# Patient Record
Sex: FEMALE | Race: WHITE
[De-identification: names, ages, dates, MRNs, and addresses within clinical notes are randomized per-mention and may not be internally consistent; named-entity substitution may affect disease eponyms.]

---

## 2018-12-25 ENCOUNTER — HOSPITAL ENCOUNTER (INPATIENT)
Dept: HOSPITAL 62 - ER | Age: 53
LOS: 5 days | Discharge: HOME | DRG: 690 | End: 2018-12-30
Attending: FAMILY MEDICINE | Admitting: FAMILY MEDICINE
Payer: COMMERCIAL

## 2018-12-25 DIAGNOSIS — Z80.9: ICD-10-CM

## 2018-12-25 DIAGNOSIS — E66.01: ICD-10-CM

## 2018-12-25 DIAGNOSIS — I10: ICD-10-CM

## 2018-12-25 DIAGNOSIS — J44.9: ICD-10-CM

## 2018-12-25 DIAGNOSIS — E11.9: ICD-10-CM

## 2018-12-25 DIAGNOSIS — Z90.710: ICD-10-CM

## 2018-12-25 DIAGNOSIS — I25.10: ICD-10-CM

## 2018-12-25 DIAGNOSIS — E78.00: ICD-10-CM

## 2018-12-25 DIAGNOSIS — B96.20: ICD-10-CM

## 2018-12-25 DIAGNOSIS — I25.2: ICD-10-CM

## 2018-12-25 DIAGNOSIS — Z87.891: ICD-10-CM

## 2018-12-25 DIAGNOSIS — Z85.42: ICD-10-CM

## 2018-12-25 DIAGNOSIS — Z23: ICD-10-CM

## 2018-12-25 DIAGNOSIS — Z90.49: ICD-10-CM

## 2018-12-25 DIAGNOSIS — Z79.84: ICD-10-CM

## 2018-12-25 DIAGNOSIS — N10: Primary | ICD-10-CM

## 2018-12-25 DIAGNOSIS — Z82.49: ICD-10-CM

## 2018-12-25 PROCEDURE — 83735 ASSAY OF MAGNESIUM: CPT

## 2018-12-25 PROCEDURE — 71045 X-RAY EXAM CHEST 1 VIEW: CPT

## 2018-12-25 PROCEDURE — 90686 IIV4 VACC NO PRSV 0.5 ML IM: CPT

## 2018-12-25 PROCEDURE — 87077 CULTURE AEROBIC IDENTIFY: CPT

## 2018-12-25 PROCEDURE — S0119 ONDANSETRON 4 MG: HCPCS

## 2018-12-25 PROCEDURE — 85610 PROTHROMBIN TIME: CPT

## 2018-12-25 PROCEDURE — 87186 SC STD MICRODIL/AGAR DIL: CPT

## 2018-12-25 PROCEDURE — 96374 THER/PROPH/DIAG INJ IV PUSH: CPT

## 2018-12-25 PROCEDURE — 81001 URINALYSIS AUTO W/SCOPE: CPT

## 2018-12-25 PROCEDURE — 80048 BASIC METABOLIC PNL TOTAL CA: CPT

## 2018-12-25 PROCEDURE — 80053 COMPREHEN METABOLIC PANEL: CPT

## 2018-12-25 PROCEDURE — 82803 BLOOD GASES ANY COMBINATION: CPT

## 2018-12-25 PROCEDURE — 87088 URINE BACTERIA CULTURE: CPT

## 2018-12-25 PROCEDURE — 82962 GLUCOSE BLOOD TEST: CPT

## 2018-12-25 PROCEDURE — 93005 ELECTROCARDIOGRAM TRACING: CPT

## 2018-12-25 PROCEDURE — 93010 ELECTROCARDIOGRAM REPORT: CPT

## 2018-12-25 PROCEDURE — 87086 URINE CULTURE/COLONY COUNT: CPT

## 2018-12-25 PROCEDURE — 83605 ASSAY OF LACTIC ACID: CPT

## 2018-12-25 PROCEDURE — 83036 HEMOGLOBIN GLYCOSYLATED A1C: CPT

## 2018-12-25 PROCEDURE — 80061 LIPID PANEL: CPT

## 2018-12-25 PROCEDURE — 99291 CRITICAL CARE FIRST HOUR: CPT

## 2018-12-25 PROCEDURE — 84481 FREE ASSAY (FT-3): CPT

## 2018-12-25 PROCEDURE — 84439 ASSAY OF FREE THYROXINE: CPT

## 2018-12-25 PROCEDURE — 36415 COLL VENOUS BLD VENIPUNCTURE: CPT

## 2018-12-25 PROCEDURE — 96361 HYDRATE IV INFUSION ADD-ON: CPT

## 2018-12-25 PROCEDURE — 84443 ASSAY THYROID STIM HORMONE: CPT

## 2018-12-25 PROCEDURE — 85025 COMPLETE CBC W/AUTO DIFF WBC: CPT

## 2018-12-25 PROCEDURE — 87040 BLOOD CULTURE FOR BACTERIA: CPT

## 2018-12-25 NOTE — EKG REPORT
SEVERITY:- BORDERLINE ECG -

SINUS TACHYCARDIA

BORDERLINE T WAVE ABNORMALITIES

:

Confirmed by: Amisha Aviles 25-Dec-2018 23:56:58

## 2018-12-26 LAB
ADD MANUAL DIFF: NO
ALBUMIN SERPL-MCNC: 4.3 G/DL (ref 3.5–5)
ALP SERPL-CCNC: 77 U/L (ref 38–126)
ALT SERPL-CCNC: 30 U/L (ref 9–52)
ANION GAP SERPL CALC-SCNC: 14 MMOL/L (ref 5–19)
APPEARANCE UR: (no result)
APTT PPP: YELLOW S
AST SERPL-CCNC: 41 U/L (ref 14–36)
BASE EXCESS BLDV CALC-SCNC: -1.2 MMOL/L
BASOPHILS # BLD AUTO: 0 10^3/UL (ref 0–0.2)
BASOPHILS NFR BLD AUTO: 0.2 % (ref 0–2)
BILIRUB DIRECT SERPL-MCNC: 0.3 MG/DL (ref 0–0.4)
BILIRUB SERPL-MCNC: 0.3 MG/DL (ref 0.2–1.3)
BILIRUB UR QL STRIP: NEGATIVE
BUN SERPL-MCNC: 13 MG/DL (ref 7–20)
CALCIUM: 10 MG/DL (ref 8.4–10.2)
CHLORIDE SERPL-SCNC: 97 MMOL/L (ref 98–107)
CO2 SERPL-SCNC: 25 MMOL/L (ref 22–30)
EOSINOPHIL # BLD AUTO: 0.1 10^3/UL (ref 0–0.6)
EOSINOPHIL NFR BLD AUTO: 0.7 % (ref 0–6)
ERYTHROCYTE [DISTWIDTH] IN BLOOD BY AUTOMATED COUNT: 13.8 % (ref 11.5–14)
FREE T4 (FREE THYROXINE): 1.06 NG/DL (ref 0.78–2.19)
GLUCOSE SERPL-MCNC: 346 MG/DL (ref 75–110)
GLUCOSE UR STRIP-MCNC: >=500 MG/DL
HCO3 BLDV-SCNC: 23.4 MMOL/L (ref 20–32)
HCT VFR BLD CALC: 38.3 % (ref 36–47)
HGB BLD-MCNC: 12.6 G/DL (ref 12–15.5)
INR PPP: 1.03
KETONES UR STRIP-MCNC: NEGATIVE MG/DL
LYMPHOCYTES # BLD AUTO: 1.6 10^3/UL (ref 0.5–4.7)
LYMPHOCYTES NFR BLD AUTO: 14.5 % (ref 13–45)
MCH RBC QN AUTO: 28.7 PG (ref 27–33.4)
MCHC RBC AUTO-ENTMCNC: 33 G/DL (ref 32–36)
MCV RBC AUTO: 87 FL (ref 80–97)
MONOCYTES # BLD AUTO: 0.6 10^3/UL (ref 0.1–1.4)
MONOCYTES NFR BLD AUTO: 5.8 % (ref 3–13)
NEUTROPHILS # BLD AUTO: 8.6 10^3/UL (ref 1.7–8.2)
NEUTS SEG NFR BLD AUTO: 78.8 % (ref 42–78)
NITRITE UR QL STRIP: NEGATIVE
PCO2 BLDV: 38.6 MMHG (ref 35–63)
PH BLDV: 7.4 [PH] (ref 7.3–7.42)
PH UR STRIP: 6 [PH] (ref 5–9)
PLATELET # BLD: 305 10^3/UL (ref 150–450)
POTASSIUM SERPL-SCNC: 4 MMOL/L (ref 3.6–5)
PROT SERPL-MCNC: 7.6 G/DL (ref 6.3–8.2)
PROT UR STRIP-MCNC: NEGATIVE MG/DL
PROTHROMBIN TIME: 14 SEC (ref 11.4–15.4)
RBC # BLD AUTO: 4.4 10^6/UL (ref 3.72–5.28)
SODIUM SERPL-SCNC: 135.7 MMOL/L (ref 137–145)
SP GR UR STRIP: 1.01
T3FREE SERPL-MCNC: 3.01 PG/ML (ref 2.77–5.27)
TOTAL CELLS COUNTED % (AUTO): 100 %
UROBILINOGEN UR-MCNC: NEGATIVE MG/DL (ref ?–2)
WBC # BLD AUTO: 10.9 10^3/UL (ref 4–10.5)

## 2018-12-26 RX ADMIN — HEPARIN SODIUM SCH UNIT: 5000 INJECTION, SOLUTION INTRAVENOUS; SUBCUTANEOUS at 05:21

## 2018-12-26 RX ADMIN — FAMOTIDINE SCH MG: 20 TABLET, FILM COATED ORAL at 21:20

## 2018-12-26 RX ADMIN — INSULIN HUMAN PRN UNIT: 100 INJECTION, SOLUTION PARENTERAL at 22:50

## 2018-12-26 RX ADMIN — Medication SCH ML: at 21:20

## 2018-12-26 RX ADMIN — MORPHINE SULFATE PRN MG: 10 INJECTION INTRAMUSCULAR; INTRAVENOUS; SUBCUTANEOUS at 16:16

## 2018-12-26 RX ADMIN — Medication SCH: at 05:02

## 2018-12-26 RX ADMIN — BUPROPION HYDROCHLORIDE SCH MG: 100 TABLET, FILM COATED ORAL at 21:20

## 2018-12-26 RX ADMIN — DOCUSATE SODIUM SCH MG: 100 CAPSULE, LIQUID FILLED ORAL at 18:13

## 2018-12-26 RX ADMIN — CYCLOBENZAPRINE HYDROCHLORIDE SCH MG: 10 TABLET, FILM COATED ORAL at 21:20

## 2018-12-26 RX ADMIN — DOCUSATE SODIUM SCH MG: 100 CAPSULE, LIQUID FILLED ORAL at 11:12

## 2018-12-26 RX ADMIN — HEPARIN SODIUM SCH UNIT: 5000 INJECTION, SOLUTION INTRAVENOUS; SUBCUTANEOUS at 21:20

## 2018-12-26 RX ADMIN — OMEGA-3-ACID ETHYL ESTERS SCH GM: 900 CAPSULE ORAL at 18:13

## 2018-12-26 RX ADMIN — ACETAMINOPHEN PRN MG: 325 TABLET ORAL at 06:21

## 2018-12-26 RX ADMIN — HEPARIN SODIUM SCH UNIT: 5000 INJECTION, SOLUTION INTRAVENOUS; SUBCUTANEOUS at 13:28

## 2018-12-26 RX ADMIN — GABAPENTIN SCH MG: 400 CAPSULE ORAL at 21:19

## 2018-12-26 RX ADMIN — ACETAMINOPHEN PRN MG: 325 TABLET ORAL at 21:24

## 2018-12-26 RX ADMIN — ASPIRIN SCH MG: 81 TABLET, COATED ORAL at 21:19

## 2018-12-26 RX ADMIN — FAMOTIDINE SCH MG: 20 TABLET, FILM COATED ORAL at 11:12

## 2018-12-26 RX ADMIN — MORPHINE SULFATE PRN MG: 10 INJECTION INTRAMUSCULAR; INTRAVENOUS; SUBCUTANEOUS at 09:01

## 2018-12-26 RX ADMIN — Medication SCH ML: at 13:28

## 2018-12-26 RX ADMIN — Medication SCH MG: at 21:19

## 2018-12-26 RX ADMIN — INSULIN HUMAN PRN UNIT: 100 INJECTION, SOLUTION PARENTERAL at 11:13

## 2018-12-26 NOTE — PDOC PROGRESS REPORT
Subjective


Progress Note for:: 12/26/18


Subjective:: 





53-year-old female admitted for fever and chills initially lactic acid was high 

4.0 improved to 2.2 today at the time of admission she is also have a subjective

fever tachycardia with heart rate of 105 elevated lactic acid levels meeting the

criteria for Sirs.  No acute events in the last 24 hours.  T-max is 99.6.


Reason For Visit: 


SIRS SYNDROME WITH PROBABLE URINARY TRACT








Physical Exam


Vital Signs: 


                                        











Temp Pulse Resp BP Pulse Ox


 


 99.6 F   99   19   120/66   96 


 


 12/26/18 07:57  12/26/18 07:57  12/26/18 07:57  12/26/18 07:57  12/26/18 07:57








                                 Intake & Output











 12/25/18 12/26/18 12/27/18





 06:59 06:59 06:59


 


Intake Total  3494 


 


Balance  3494 


 


Weight  123.8 kg 











General appearance: PRESENT: other - Morbidly obese female with a BMI of more 

than 50.


Eye exam: PRESENT: PERRLA


Mouth exam: PRESENT: moist


Neck exam: ABSENT: carotid bruit, JVD, lymphadenopathy, thyromegaly


Respiratory exam: PRESENT: clear to auscultation david.  ABSENT: rales, rhonchi, 

wheezes


Cardiovascular exam: PRESENT: tachycardia


GI/Abdominal exam: PRESENT: normal bowel sounds, soft.  ABSENT: distended, gu

arding, mass, organolmegaly, rebound, tenderness


Extremities exam: PRESENT: full ROM.  ABSENT: calf tenderness, clubbing, pedal 

edema


Neurological exam: PRESENT: alert, awake, oriented to person, oriented to place,

oriented to time, oriented to situation, CN II-XII grossly intact.  ABSENT: 

motor sensory deficit





Results


Laboratory Results: 


                                        





                                 12/26/18 00:30 





                                 12/26/18 00:30 





                                        











  12/26/18 12/26/18 12/26/18





  00:30 00:30 00:30


 


WBC  10.9 H  


 


RBC  4.40  


 


Hgb  12.6  


 


Hct  38.3  


 


MCV  87  


 


MCH  28.7  


 


MCHC  33.0  


 


RDW  13.8  


 


Plt Count  305  


 


Seg Neutrophils %  78.8 H  


 


Lymphocytes %  14.5  


 


Monocytes %  5.8  


 


Eosinophils %  0.7  


 


Basophils %  0.2  


 


Absolute Neutrophils  8.6 H  


 


Absolute Lymphocytes  1.6  


 


Absolute Monocytes  0.6  


 


Absolute Eosinophils  0.1  


 


Absolute Basophils  0.0  


 


VBG pH   


 


VBG pCO2   


 


VBG HCO3   


 


VBG Base Excess   


 


Sodium   135.7 L 


 


Potassium   4.0 


 


Chloride   97 L 


 


Carbon Dioxide   25 


 


Anion Gap   14 


 


BUN   13 


 


Creatinine   0.77 


 


Est GFR ( Amer)   > 60 


 


Est GFR (Non-Af Amer)   > 60 


 


Glucose   346 H 


 


Lactic Acid    4.0 H


 


Calcium   10.0 


 


Total Bilirubin   0.3 


 


AST   41 H 


 


ALT   30 


 


Alkaline Phosphatase   77 


 


Total Protein   7.6 


 


Albumin   4.3 


 


Free T4   


 


Free T3 pg/mL   


 


Urine Color   


 


Urine Appearance   


 


Urine pH   


 


Ur Specific Gravity   


 


Urine Protein   


 


Urine Glucose (UA)   


 


Urine Ketones   


 


Urine Blood   


 


Urine Nitrite   


 


Ur Leukocyte Esterase   


 


Urine WBC (Auto)   


 


Urine RBC (Auto)   














  12/26/18 12/26/18 12/26/18





  00:30 00:30 00:30


 


WBC   


 


RBC   


 


Hgb   


 


Hct   


 


MCV   


 


MCH   


 


MCHC   


 


RDW   


 


Plt Count   


 


Seg Neutrophils %   


 


Lymphocytes %   


 


Monocytes %   


 


Eosinophils %   


 


Basophils %   


 


Absolute Neutrophils   


 


Absolute Lymphocytes   


 


Absolute Monocytes   


 


Absolute Eosinophils   


 


Absolute Basophils   


 


VBG pH  7.40  


 


VBG pCO2  38.6  


 


VBG HCO3  23.4  


 


VBG Base Excess  -1.2  


 


Sodium   


 


Potassium   


 


Chloride   


 


Carbon Dioxide   


 


Anion Gap   


 


BUN   


 


Creatinine   


 


Est GFR ( Amer)   


 


Est GFR (Non-Af Amer)   


 


Glucose   


 


Lactic Acid   


 


Calcium   


 


Total Bilirubin   


 


AST   


 


ALT   


 


Alkaline Phosphatase   


 


Total Protein   


 


Albumin   


 


Free T4    1.06


 


Free T3 pg/mL    3.01


 


Urine Color   YELLOW 


 


Urine Appearance   SLIGHTLY-CLOUDY 


 


Urine pH   6.0 


 


Ur Specific Gravity   1.012 


 


Urine Protein   NEGATIVE 


 


Urine Glucose (UA)   >=500 H 


 


Urine Ketones   NEGATIVE 


 


Urine Blood   MODERATE H 


 


Urine Nitrite   NEGATIVE 


 


Ur Leukocyte Esterase   LARGE H 


 


Urine WBC (Auto)   145 


 


Urine RBC (Auto)   19 














  12/26/18





  04:22


 


WBC 


 


RBC 


 


Hgb 


 


Hct 


 


MCV 


 


MCH 


 


MCHC 


 


RDW 


 


Plt Count 


 


Seg Neutrophils % 


 


Lymphocytes % 


 


Monocytes % 


 


Eosinophils % 


 


Basophils % 


 


Absolute Neutrophils 


 


Absolute Lymphocytes 


 


Absolute Monocytes 


 


Absolute Eosinophils 


 


Absolute Basophils 


 


VBG pH 


 


VBG pCO2 


 


VBG HCO3 


 


VBG Base Excess 


 


Sodium 


 


Potassium 


 


Chloride 


 


Carbon Dioxide 


 


Anion Gap 


 


BUN 


 


Creatinine 


 


Est GFR ( Amer) 


 


Est GFR (Non-Af Amer) 


 


Glucose 


 


Lactic Acid  2.2 H


 


Calcium 


 


Total Bilirubin 


 


AST 


 


ALT 


 


Alkaline Phosphatase 


 


Total Protein 


 


Albumin 


 


Free T4 


 


Free T3 pg/mL 


 


Urine Color 


 


Urine Appearance 


 


Urine pH 


 


Ur Specific Gravity 


 


Urine Protein 


 


Urine Glucose (UA) 


 


Urine Ketones 


 


Urine Blood 


 


Urine Nitrite 


 


Ur Leukocyte Esterase 


 


Urine WBC (Auto) 


 


Urine RBC (Auto) 











Impressions: 


                                        





Chest X-Ray  12/26/18 00:25


IMPRESSION:


 


No acute cardiopulmonary process


 


 


copyright 2011 [employer]- All Rights Reserved


 














Assessment & Plan





- Diagnosis


(1) SIRS (systemic inflammatory response syndrome)


Is this a current diagnosis for this admission?: Yes   


Plan: 


With lactic acidosis, mild leukocytosis, minimal tachycardia and a possible 

urinary tract infection, SIRS criteria have been met.  However this patient is 

very unlikely to have sepsis, so much so that at this point it can be 

affirmatively stated that sepsis has been ruled out.  She will be treated with 

broad-spectrum antibiotics for her role urinary tract infection and serial 

lactic acid measurements will be obtained her vital signs will be observed and 

her overall clinical course will be monitored.  Serial laboratory evaluations of

her metabolic profile and CBC will be obtained.








12/26/2018-temperature today is 99.6.  Patient is on Invanz.  Lactic acid level 

came down to 2.2.  We are going to continue to follow the CBC and chemistry.  

Cultures urine cultures are pending.  Plan is to continue the present manag

ement.








(2) UTI (urinary tract infection)


Qualifiers: 


   Urinary tract infection type: acute pyelonephritis   Qualified Code(s): N10 -

Acute pyelonephritis   


Is this a current diagnosis for this admission?: Yes   


Plan: 


Patient was noted to have acute right CVA tenderness far greater than the left 

giving suspicion to a distinct possibility of an acute right pyelonephritis.  

Her urine does show significant pyuria based on her positive leukocyte esterase 

however a moderately positive blood chemical reaction is not necessarily 

indicative of hematuria.  She will be treated with broad-spectrum antibiotics 

until urine culture results are available and spectrum of the antibiotic therapy

can be narrowed as appropriate.





12/26/2018-on examination there is no right CVA tenderness.  Urinalysis shows 

pyuria with positive leukocyte esterase.  Cultures are pending.  Patient is on 

broad-spectrum antibiotics.  Is to continue the present management and wait for 

the urine cultures.








(3) Diabetes mellitus type 2 in obese


Is this a current diagnosis for this admission?: Yes   


Plan: 


Patient will be treated with her usual diabetic therapy with the addition of a 

sliding scale insulin coverage for hyperglycemia.  Hemoglobin A1c will be 

obtained to assess the efficacy of the prior therapy and adjustments will be 

made as appropriate.








12/26/2018-started on insulin sliding scale.  Blood sugars are 253 today.  

Patient is on metformin at home.  We are going to hold metformin.  Patient is 

also on Victoza.  We are going to hold Victoza during this hospital stay.  

Hemoglobin A1c is pending.








(4) HTN (hypertension)


Qualifiers: 


   Hypertension type: essential hypertension   Qualified Code(s): I10 - 

Essential (primary) hypertension   


Is this a current diagnosis for this admission?: Yes   


Plan: 


Patient will be continued on her current antihypertensive regiment once her home

meds have been established.  Efficacy of this therapy will be assessed over 

hospital course with serial evaluations of her vital signs including blood 

pressure.








12/26/2018 blood pressure is 120/66.  Plan for today is to discontinue IV fluids

and restart on losartan.  pT is on 50 mg of losartan daily at home.








(5) Morbid obesity with BMI of 45.0-49.9, adult


Is this a current diagnosis for this admission?: Yes   


Plan: 


12/26/2018-diet exercise weight loss and lifestyle modifications are advised.  

Dietary consult was requested.  BMI is more than 50.








- Time


Time Spent with patient: 15-24 minutes


Medications reviewed and adjusted accordingly: Yes


Anticipated discharge: Home

## 2018-12-26 NOTE — RADIOLOGY REPORT (SQ)
EXAM DESCRIPTION: 



XR CHEST 1 VIEW



COMPLETED DATE/TME:  12/26/2018 00:25



CLINICAL HISTORY: 



53 years, Female, SHORT OF BREATH, TACHYCARDIA



COMPARISON:

None.



NUMBER OF VIEWS:

1



TECHNIQUE:

Portable chest



LIMITATIONS:

None.



FINDINGS:



The heart size is normal. Osteopenia. Mild atheromatous change

thoracic aorta. No pneumothorax. Lungs are clear



IMPRESSION:



No acute cardiopulmonary process

 



copyright 2011 [employer]- All Rights Reserved

## 2018-12-26 NOTE — PDOC H&P
History of Present Illness


Admission Date/PCP: 


  18 01:47





  





Patient complains of: Fever and chills


History of Present Illness: 


ROJAS ORELLANA is a 53 year old female who presents the emergency room with a 1 

day history of fever with chills associated with lower back pain.  Subjective 

fever with chills has been severe and the lumbar (vague, nonradiating, bilateral

right possibly worse than left) pressure-like pain which she rates as very 

severe.  Both the pain and fever have been present since their onset on the day 

prior to admission.  The symptoms began as a backache and low-grade fever in the

early afternoon and progressively worsened gradually throughout the evening 

until she came to the emergency room.  She admits numerous prior similar 

episodes some of which have required hospitalization and treatment for "sepsis",

these usually occur with urinary tract or respiratory tract infections.  She has

not identified any aggravating or ameliorating factors for her fever or back 

pain.  In the emergency room she was found to have a normal white count and be 

afebrile.  Her urinalysis was suspect for a urinary tract infection and she was 

about to be discharged home when her serum lactic acid was reported is 4.0.  The

patient was then admitted to the hospital to be evaluated for possible sepsis at

the insistence of the emergency room physician.





Past Medical History


Cardiac Medical History: Reports: Coronary Artery Disease, Myocardial 

Infarction, Hyperlipidema, Hypertension


   Denies: DVT, Pulmonary Embolism


Pulmonary Medical History: Reports: Bronchitis, Pneumonia


   Denies: Chronic Obstructive Pulmonary Disease (COPD)


EENT Medical History: Reports: None


Neurological Medical History: 


   Denies: Hemorrhagic CVA, Ischemic CVA, Seizures


Endocrine Medical History: Reports: Diabetes Mellitus Type 2, Obesity


Renal/ Medical History: Reports: Other - Frequent urinary tract infections, hx

of horseshoe kidney (non-unified)


   Denies: Chronic Kidney Disease, Nephrolithiasis


Malignancy Medical History: Reports: Other - Uterine cancer


GI Medical History: 


   Denies: Cirrhosis, Hepatitis


Musculoskeltal Medical History: 


   Denies: Arthritis, Gout


Skin Medical History: 


   Denies: Eczema, Psoriasis


Psychiatric Medical History: 


   Denies: Alcohol Dependency, Substance Abuse, Tobacco Dependency


Traumatic Medical History: Reports: None


Hematology: 


   Denies: Anemia, Bleeding Tendencies


Infectious Medical History: Reports: None





Past Surgical History


Past Surgical History: Reports:  Section, Cholecystectomy, Hysterectomy





Social History


Smoking Status: Former Smoker





- Advance Directive


Resuscitation Status: Full Code


Surrogate healthcare decision maker:: 


Agustina Tse





Family History


Family History: Hypertension, Malignancy


Parental Family History Reviewed: Yes


Children Family History Reviewed: No


Sibling(s) Family History Reviewed.: Yes





Medication/Allergy


Allergies/Adverse Reactions: 


                                        





No Known Allergies Allergy (Verified 18 01:25)


   











Review of Systems


Constitutional: PRESENT: as per HPI, chills, fever(s)


Eyes: ABSENT: visual disturbances, other - Ocular pain


Ears: ABSENT: hearing changes, other - Ear pain


Nose, Mouth, and Throat: ABSENT: mouth pain, sore throat


Cardiovascular: ABSENT: chest pain, dyspnea on exertion, orthropnea, 

palpitations


Respiratory: ABSENT: cough, dyspnea


Gastrointestinal: ABSENT: abdominal pain, constipation, diarrhea, nausea, 

vomiting


Genitourinary: PRESENT: as per HPI, other - Flank pain, foul-smelling urine.  

ABSENT: dysuria, hematuria


Musculoskeletal: PRESENT: as per HPI, back pain.  ABSENT: deformity, joint 

swelling


Integumentary: ABSENT: pruritus, rash


Neurological: ABSENT: confusion, convulsions, memory loss, syncope, vertigo


Psychiatric: ABSENT: anxiety, depression


Endocrine: ABSENT: cold intolerance, heat intolerance


Hematologic/Lymphatic: ABSENT: easy bleeding, easy bruising





Physical Exam


Vital Signs: 


                                        











Temp Pulse Resp BP Pulse Ox


 


 98.4 F   131 H  19   134/67 H  97 


 


 18 00:40  18 23:06  18 02:01  18 02:01  18 02:01








                                 Intake & Output











 18





 23:59 23:59 23:59


 


Intake Total   1000


 


Balance   1000


 


Weight  119.8 kg 











General appearance: PRESENT: no acute distress, cooperative, morbidly obese


Head exam: PRESENT: atraumatic, normocephalic


Eye exam: PRESENT: EOMI.  ABSENT: conjunctival injection, scleral icterus


Ear exam: PRESENT: normal external ear exam.  ABSENT: bleeding, drainage


Mouth exam: PRESENT: dry mucosa, neck supple


Neck exam: ABSENT: thyromegaly, tracheal deviation


Respiratory exam: PRESENT: clear to auscultation david, symmetrical, unlabored


Cardiovascular exam: PRESENT: RRR.  ABSENT: clicks, gallop, rubs


Pulses: PRESENT: normal radial pulses, normal dorsalis pedis pul


Vascular exam: PRESENT: normal capillary refill.  ABSENT: pallor


GI/Abdominal exam: PRESENT: normal bowel sounds, soft


Rectal exam: PRESENT: deferred


Extremities exam: ABSENT: joint swelling, pedal edema


Musculoskeletal exam: ABSENT: deformity, dislocation, tenderness


Neurological exam: PRESENT: alert, oriented to person, oriented to place, 

oriented to time, oriented to situation, CN II-XII grossly intact.  ABSENT: 

motor sensory deficit


Psychiatric exam: PRESENT: appropriate affect, normal mood


Skin exam: PRESENT: dry, intact, warm.  ABSENT: jaundice, pallor, rash, 

urticaria





Results


Laboratory Results: 


                                        





                                 18 00:30 





                                 18 00:30 





                                        











  18





  00:30 00:30 00:30


 


WBC  10.9 H  


 


RBC  4.40  


 


Hgb  12.6  


 


Hct  38.3  


 


MCV  87  


 


MCH  28.7  


 


MCHC  33.0  


 


RDW  13.8  


 


Plt Count  305  


 


Seg Neutrophils %  78.8 H  


 


Lymphocytes %  14.5  


 


Monocytes %  5.8  


 


Eosinophils %  0.7  


 


Basophils %  0.2  


 


Absolute Neutrophils  8.6 H  


 


Absolute Lymphocytes  1.6  


 


Absolute Monocytes  0.6  


 


Absolute Eosinophils  0.1  


 


Absolute Basophils  0.0  


 


VBG pH   


 


VBG pCO2   


 


VBG HCO3   


 


VBG Base Excess   


 


Sodium   135.7 L 


 


Potassium   4.0 


 


Chloride   97 L 


 


Carbon Dioxide   25 


 


Anion Gap   14 


 


BUN   13 


 


Creatinine   0.77 


 


Est GFR ( Amer)   > 60 


 


Est GFR (Non-Af Amer)   > 60 


 


Glucose   346 H 


 


Lactic Acid    4.0 H


 


Calcium   10.0 


 


Total Bilirubin   0.3 


 


AST   41 H 


 


ALT   30 


 


Alkaline Phosphatase   77 


 


Total Protein   7.6 


 


Albumin   4.3 


 


Urine Color   


 


Urine Appearance   


 


Urine pH   


 


Ur Specific Gravity   


 


Urine Protein   


 


Urine Glucose (UA)   


 


Urine Ketones   


 


Urine Blood   


 


Urine Nitrite   


 


Ur Leukocyte Esterase   


 


Urine WBC (Auto)   


 


Urine RBC (Auto)   














  18





  00:30 00:30


 


WBC  


 


RBC  


 


Hgb  


 


Hct  


 


MCV  


 


MCH  


 


MCHC  


 


RDW  


 


Plt Count  


 


Seg Neutrophils %  


 


Lymphocytes %  


 


Monocytes %  


 


Eosinophils %  


 


Basophils %  


 


Absolute Neutrophils  


 


Absolute Lymphocytes  


 


Absolute Monocytes  


 


Absolute Eosinophils  


 


Absolute Basophils  


 


VBG pH  7.40 


 


VBG pCO2  38.6 


 


VBG HCO3  23.4 


 


VBG Base Excess  -1.2 


 


Sodium  


 


Potassium  


 


Chloride  


 


Carbon Dioxide  


 


Anion Gap  


 


BUN  


 


Creatinine  


 


Est GFR ( Amer)  


 


Est GFR (Non-Af Amer)  


 


Glucose  


 


Lactic Acid  


 


Calcium  


 


Total Bilirubin  


 


AST  


 


ALT  


 


Alkaline Phosphatase  


 


Total Protein  


 


Albumin  


 


Urine Color   YELLOW


 


Urine Appearance   SLIGHTLY-CLOUDY


 


Urine pH   6.0


 


Ur Specific Gravity   1.012


 


Urine Protein   NEGATIVE


 


Urine Glucose (UA)   >=500 H


 


Urine Ketones   NEGATIVE


 


Urine Blood   MODERATE H


 


Urine Nitrite   NEGATIVE


 


Ur Leukocyte Esterase   LARGE H


 


Urine WBC (Auto)   145


 


Urine RBC (Auto)   19











Impressions: 


                                        





Chest X-Ray  18 00:25


IMPRESSION:


 


No acute cardiopulmonary process


 


 


copyright  [employer]- All Rights Reserved


 














Assessment & Plan





- Diagnosis


(1) SIRS (systemic inflammatory response syndrome)


Is this a current diagnosis for this admission?: Yes   


Plan: 


With lactic acidosis, mild leukocytosis, minimal tachycardia and a possible 

urinary tract infection, SIRS criteria have been met.  However this patient is 

very unlikely to have sepsis, so much so that at this point it can be 

affirmatively stated that sepsis has been ruled out.  She will be treated with 

broad-spectrum antibiotics for her role urinary tract infection and serial 

lactic acid measurements will be obtained her vital signs will be observed and 

her overall clinical course will be monitored.  Serial laboratory evaluations of

her metabolic profile and CBC will be obtained.








(2) UTI (urinary tract infection)


Qualifiers: 


   Urinary tract infection type: acute pyelonephritis   Qualified Code(s): N10 -

Acute pyelonephritis   


Is this a current diagnosis for this admission?: Yes   


Plan: 


Patient was noted to have acute right CVA tenderness far greater than the left 

giving suspicion to a distinct possibility of an acute right pyelonephritis.  

Her urine does show significant pyuria based on her positive leukocyte esterase 

however a moderately positive blood chemical reaction is not necessarily 

indicative of hematuria.  She will be treated with broad-spectrum antibiotics 

until urine culture results are available and spectrum of the antibiotic therapy

can be narrowed as appropriate.








(3) Diabetes mellitus type 2 in obese


Is this a current diagnosis for this admission?: Yes   


Plan: 


Patient will be treated with her usual diabetic therapy with the addition of a 

sliding scale insulin coverage for hyperglycemia.  Hemoglobin A1c will be 

obtained to assess the efficacy of the prior therapy and adjustments will be 

made as appropriate.








(4) HTN (hypertension)


Qualifiers: 


   Hypertension type: essential hypertension   Qualified Code(s): I10 - Essent

ial (primary) hypertension   


Is this a current diagnosis for this admission?: Yes   


Plan: 


Patient will be continued on her current antihypertensive regiment once her home

meds have been established.  Efficacy of this therapy will be assessed over 

hospital course with serial evaluations of her vital signs including blood 

pressure.








(5) Morbid obesity with BMI of 45.0-49.9, adult


Is this a current diagnosis for this admission?: Yes   


Plan: 


Patient will be evaluated by dietitian for instruction in weight loss diet, 

improved diabetic control and lifestyle changes to assist the patient in 

maintaining a long-term improvement in her health via a long-term weight loss 

and diabetic diet management plan.








- Time


Time Spent: 30 to 50 Minutes


Critical Time spent with patient: Less than 15 minutes


Medications reviewed and adjusted accordingly: Yes - Patient has some difficulty

remembering her medications.


Anticipated discharge: Home, Home with Homehealth





- Inpatient Certification


Based on my medical assessment, after consideration of the patient's 

comorbidities, presenting symptoms, or acuity I expect that the services needed 

warrant INPATIENT care.: Yes


I certify that my determination is in accordance with my understanding of 

Medicare's requirements for reasonable and necessary INPATIENT services [42 CFR 

412.3e].: Yes


Medical Necessity: Significant Comorbidiites Make Outpatient Treatment Too 

Risky, Need Close Monitoring Due to Risk of Patient Decompensation, Need for IV 

Antibiotics

## 2018-12-26 NOTE — ER DOCUMENT REPORT
ED General





- General


Chief Complaint: Shortness Of Breath


Stated Complaint: DIFFICULTY BREATHING/URINARY ISSUE


Time Seen by Provider: 18 00:21


Notes: 





Patient is a 53-year-old female with COPD, diabetes, hypertension that presents 

to the emergency department for chief complaint of fever, chills, and concern 

for UTI.  Patient states that she is visiting from out of town, and states 

starting having chills, sweats and subjective fever, which she usually gets when

she has a urinary tract infection, she states she is been septic before, r

equired hospitalization, in the past.  She is on suppressive medication for 

UTIs, which she states has reduce the number of severe infection she gets, but 

she still gets some.  She has had urinary frequency, but denies any dysuria.  

She denies having any abdominal pain, has had mild nausea without vomiting.  She

is chronically short of breath and on oxygen 2 L 24 hours a day for her COPD, 

but her shortness of breath is not worsened anytime recently.  Denies having any

cough, chest pain.





Past Medical History: COPD, hypertension, diabetes mellitus


Past Surgical History: Kidney surgery, hysterectomy, , cholecystectomy


Social History: Former smoker, denies alcohol or drug use.


Family History: Reviewed and noncontributory for presenting illness


Allergies: Reviewed, see documented allergy list. 





REVIEW OF SYSTEMS:


Other than noted above, the 12 point review of systems was reviewed with the 

patient and were negative, all pertinent findings are included in the HPI.





PHYSICAL EXAMINATION:





Vital signs reviewed, nursing noted reviewed. 





GENERAL: Obese female, appears uncomfortable





HEAD: Atraumatic, normocephalic.





EYES: Eyes appear normal, extraocular movements intact, sclera anicteric, 

conjunctiva are normal.





ENT: nares patent, oropharynx clear without exudates.  Moist mucous membranes.





NECK: Normal range of motion, supple without lymphadenopathy





LUNGS: Diminished lung sounds, no wheezing, rhonchi or acute respiratory 

distress.





HEART: Heart rate tachycardic, regular rhythm





ABDOMEN: Soft, obese, nontender, normoactive bowel sounds.  No rebound, 

guarding, or rigidity. No masses appreciated.





EXTREMITIES: Nontender, good range of motion, trace pedal edema bilaterally





NEUROLOGICAL: No focal neurological deficits. Moves all extremities 

spontaneously Motor and sensory grossly intact on exam.





PSYCH: Normal mood, normal affect.





SKIN: Warm, Dry, normal turgor, no rashes or lesions noted on exposed skin





TRAVEL OUTSIDE OF THE [place] IN LAST 30 DAYS: No





- Related Data


Allergies/Adverse Reactions: 


                                        





No Known Allergies Allergy (Verified 18 01:25)


   











Past Medical History





- Social History


Smoking Status: Former Smoker


Family History: Reviewed & Not Pertinent





Physical Exam





- Vital signs


Vitals: 


                                        











Temp Pulse Resp BP Pulse Ox


 


 100.3 F   131 H  24 H  132/63 H  95 


 


 18 23:06  18 23:06  18 23:06  18 23:06  18 23:06














Course





- Re-evaluation


Re-evalutation: 





Patient seen and examined vital signs reviewed. 





Laboratory data and imaging were ordered as appropriate for the patient's 

presenting symptoms and complaint, with consideration of any critical or life 

threatening conditions that may be associated with their obtained history and 

exam as noted above.





Patient was treated with IV fluid bolusing, IV Zosyn





Results were reviewed when available and demonstrated mild leukocytosis, 

elevated lactic acid, and positive UA for urinary tract infection, chest x-ray 

was not convincing of pneumonia.  Patient did meet severe sepsis criteria based 

on elevated lactic acid, tachycardia, and source of UTI.





The patient was re-evaluated and was still tachycardic, additional IV fluid was 

ordered, patient's IV fluids were based off of ideal body weight, to obtain her 

30 mL/kg bolus, given that she is morbidly obese.





Evaluation was most consistent with sepsis secondary to urinary tract infection,

tachycardia, leukocytosis





Results were discussed with the patient at this point after careful 

consideration I feel that that patient should be admitted to the hospital. This 

was discussed with the patient that it is in the best interest for their care to

be admitted for further evaluation and management.  Patient agreed with this 

plan of care. A call was placed to the admitted physician, Dr. Weiland who 

graciously accepted the patient onto their service.





*Note is created using voice recognition software and may contain spelling, s

yntax or grammatical errors.








Laboratory











  18





  00:30 00:30 00:30


 


WBC  10.9 H  


 


RBC  4.40  


 


Hgb  12.6  


 


Hct  38.3  


 


MCV  87  


 


MCH  28.7  


 


MCHC  33.0  


 


RDW  13.8  


 


Plt Count  305  


 


Seg Neutrophils %  78.8 H  


 


Lymphocytes %  14.5  


 


Monocytes %  5.8  


 


Eosinophils %  0.7  


 


Basophils %  0.2  


 


Absolute Neutrophils  8.6 H  


 


Absolute Lymphocytes  1.6  


 


Absolute Monocytes  0.6  


 


Absolute Eosinophils  0.1  


 


Absolute Basophils  0.0  


 


PT   14.0 


 


INR   1.03 


 


VBG pH   


 


VBG pCO2   


 


VBG HCO3   


 


VBG Base Excess   


 


Sodium    135.7 L


 


Potassium    4.0


 


Chloride    97 L


 


Carbon Dioxide    25


 


Anion Gap    14


 


BUN    13


 


Creatinine    0.77


 


Est GFR ( Amer)    > 60


 


Est GFR (Non-Af Amer)    > 60


 


Glucose    346 H


 


POC Glucose   


 


Lactic Acid   


 


Calcium    10.0


 


Total Bilirubin    0.3


 


Direct Bilirubin    0.3


 


Neonat Total Bilirubin    Not Reportable


 


Neonat Direct Bilirubin    Not Reportable


 


Neonat Indirect Bili    Not Reportable


 


AST    41 H


 


ALT    30


 


Alkaline Phosphatase    77


 


Total Protein    7.6


 


Albumin    4.3


 


Urine Color   


 


Urine Appearance   


 


Urine pH   


 


Ur Specific Gravity   


 


Urine Protein   


 


Urine Glucose (UA)   


 


Urine Ketones   


 


Urine Blood   


 


Urine Nitrite   


 


Urine Bilirubin   


 


Urine Urobilinogen   


 


Ur Leukocyte Esterase   


 


Urine WBC (Auto)   


 


Urine RBC (Auto)   


 


Urine Bacteria (Auto)   


 


Squamous Epi Cells Auto   


 


Urine Ascorbic Acid   














  18





  00:30 00:30 00:30


 


WBC   


 


RBC   


 


Hgb   


 


Hct   


 


MCV   


 


MCH   


 


MCHC   


 


RDW   


 


Plt Count   


 


Seg Neutrophils %   


 


Lymphocytes %   


 


Monocytes %   


 


Eosinophils %   


 


Basophils %   


 


Absolute Neutrophils   


 


Absolute Lymphocytes   


 


Absolute Monocytes   


 


Absolute Eosinophils   


 


Absolute Basophils   


 


PT   


 


INR   


 


VBG pH   7.40 


 


VBG pCO2   38.6 


 


VBG HCO3   23.4 


 


VBG Base Excess   -1.2 


 


Sodium   


 


Potassium   


 


Chloride   


 


Carbon Dioxide   


 


Anion Gap   


 


BUN   


 


Creatinine   


 


Est GFR ( Amer)   


 


Est GFR (Non-Af Amer)   


 


Glucose   


 


POC Glucose   


 


Lactic Acid  4.0 H  


 


Calcium   


 


Total Bilirubin   


 


Direct Bilirubin   


 


Neonat Total Bilirubin   


 


Neonat Direct Bilirubin   


 


Neonat Indirect Bili   


 


AST   


 


ALT   


 


Alkaline Phosphatase   


 


Total Protein   


 


Albumin   


 


Urine Color    YELLOW


 


Urine Appearance    SLIGHTLY-CLOUDY


 


Urine pH    6.0


 


Ur Specific Gravity    1.012


 


Urine Protein    NEGATIVE


 


Urine Glucose (UA)    >=500 H


 


Urine Ketones    NEGATIVE


 


Urine Blood    MODERATE H


 


Urine Nitrite    NEGATIVE


 


Urine Bilirubin    NEGATIVE


 


Urine Urobilinogen    NEGATIVE


 


Ur Leukocyte Esterase    LARGE H


 


Urine WBC (Auto)    145


 


Urine RBC (Auto)    19


 


Urine Bacteria (Auto)    1+


 


Squamous Epi Cells Auto    1


 


Urine Ascorbic Acid    NEGATIVE














  18





  01:02


 


WBC 


 


RBC 


 


Hgb 


 


Hct 


 


MCV 


 


MCH 


 


MCHC 


 


RDW 


 


Plt Count 


 


Seg Neutrophils % 


 


Lymphocytes % 


 


Monocytes % 


 


Eosinophils % 


 


Basophils % 


 


Absolute Neutrophils 


 


Absolute Lymphocytes 


 


Absolute Monocytes 


 


Absolute Eosinophils 


 


Absolute Basophils 


 


PT 


 


INR 


 


VBG pH 


 


VBG pCO2 


 


VBG HCO3 


 


VBG Base Excess 


 


Sodium 


 


Potassium 


 


Chloride 


 


Carbon Dioxide 


 


Anion Gap 


 


BUN 


 


Creatinine 


 


Est GFR ( Amer) 


 


Est GFR (Non-Af Amer) 


 


Glucose 


 


POC Glucose  303 H


 


Lactic Acid 


 


Calcium 


 


Total Bilirubin 


 


Direct Bilirubin 


 


Neonat Total Bilirubin 


 


Neonat Direct Bilirubin 


 


Neonat Indirect Bili 


 


AST 


 


ALT 


 


Alkaline Phosphatase 


 


Total Protein 


 


Albumin 


 


Urine Color 


 


Urine Appearance 


 


Urine pH 


 


Ur Specific Gravity 


 


Urine Protein 


 


Urine Glucose (UA) 


 


Urine Ketones 


 


Urine Blood 


 


Urine Nitrite 


 


Urine Bilirubin 


 


Urine Urobilinogen 


 


Ur Leukocyte Esterase 


 


Urine WBC (Auto) 


 


Urine RBC (Auto) 


 


Urine Bacteria (Auto) 


 


Squamous Epi Cells Auto 


 


Urine Ascorbic Acid 











                                        





Chest X-Ray  18 00:25


IMPRESSION:


 


No acute cardiopulmonary process


 


 


copyright  [employer]- All Rights Reserved


 

















- Vital Signs


Vital signs: 


                                        











Temp Pulse Resp BP Pulse Ox


 


 98.6 F   105 H  22 H  117/66   98 


 


 18 03:38  18 03:38  18 03:38  18 03:38  18 03:38














- Laboratory


Result Diagrams: 


                                 18 00:30





                                 18 00:30


Laboratory results interpreted by me: 


                                        











  18





  00:30 00:30 00:30


 


WBC  10.9 H  


 


Seg Neutrophils %  78.8 H  


 


Absolute Neutrophils  8.6 H  


 


Sodium   135.7 L 


 


Chloride   97 L 


 


Glucose   346 H 


 


POC Glucose   


 


Lactic Acid    4.0 H


 


AST   41 H 


 


Urine Glucose (UA)   


 


Urine Blood   


 


Ur Leukocyte Esterase   














  18





  00:30 01:02


 


WBC  


 


Seg Neutrophils %  


 


Absolute Neutrophils  


 


Sodium  


 


Chloride  


 


Glucose  


 


POC Glucose   303 H


 


Lactic Acid  


 


AST  


 


Urine Glucose (UA)  >=500 H 


 


Urine Blood  MODERATE H 


 


Ur Leukocyte Esterase  LARGE H 














- EKG Interpretation by Me


Additional EKG results interpreted by me: 


EKG demonstrates sinus tachycardia with a ventricular rate of 127 bpm, normal 

axis, normal intervals, no evidence of acute ischemia on this EKG.





Critical Care Note





- Critical Care Note


Total time excluding time spent on procedures (mins): 38


Comments: 





Critical care time 38 minutes exclusive from separate billable procedures for a 

patient requiring complex medical decision making, and high potential for 

clinical deterioration.  In a patient with severe sepsis, requiring fluid 

resuscitation, close monitoring and admission to the hospital. Time spent obtai

david history from patient or surrogate, discussions with consultants, 

development of treatment plan with patient or surrogate, evaluation of patient's

response to treatment, examination of patient, ordering and performing 

treatments and interventions, ordering and review of laboratory studies, re-ev

aluation of patient's condition, ordering and review of radiographic studies and

review of old charts





Discharge





- Discharge


Clinical Impression: 


 Severe sepsis, Lactic acidosis





UTI (urinary tract infection)


Qualifiers:


 Urinary tract infection type: acute pyelonephritis Qualified Code(s): N10 - 

Acute pyelonephritis





Leukocytosis


Qualifiers:


 Leukocytosis type: unspecified Qualified Code(s): D72.829 - Elevated white 

blood cell count, unspecified





Condition: Stable


Disposition: ADMITTED AS INPATIENT


Admitting Provider: Hospitalist - Dr. Weiland


Unit Admitted: Telemetry

## 2018-12-27 LAB
ADD MANUAL DIFF: NO
ANION GAP SERPL CALC-SCNC: 9 MMOL/L (ref 5–19)
BASOPHILS # BLD AUTO: 0.1 10^3/UL (ref 0–0.2)
BASOPHILS NFR BLD AUTO: 0.9 % (ref 0–2)
BUN SERPL-MCNC: 11 MG/DL (ref 7–20)
CALCIUM: 8.9 MG/DL (ref 8.4–10.2)
CHLORIDE SERPL-SCNC: 102 MMOL/L (ref 98–107)
CHOLEST SERPL-MCNC: 107.72 MG/DL (ref 0–200)
CO2 SERPL-SCNC: 29 MMOL/L (ref 22–30)
EOSINOPHIL # BLD AUTO: 0.2 10^3/UL (ref 0–0.6)
EOSINOPHIL NFR BLD AUTO: 3.1 % (ref 0–6)
ERYTHROCYTE [DISTWIDTH] IN BLOOD BY AUTOMATED COUNT: 13.9 % (ref 11.5–14)
GLUCOSE SERPL-MCNC: 209 MG/DL (ref 75–110)
HCT VFR BLD CALC: 32.5 % (ref 36–47)
HGB BLD-MCNC: 11 G/DL (ref 12–15.5)
LDLC SERPL DIRECT ASSAY-MCNC: 74 MG/DL (ref ?–100)
LYMPHOCYTES # BLD AUTO: 2.1 10^3/UL (ref 0.5–4.7)
LYMPHOCYTES NFR BLD AUTO: 28.7 % (ref 13–45)
MCH RBC QN AUTO: 28.8 PG (ref 27–33.4)
MCHC RBC AUTO-ENTMCNC: 33.9 G/DL (ref 32–36)
MCV RBC AUTO: 85 FL (ref 80–97)
MONOCYTES # BLD AUTO: 0.5 10^3/UL (ref 0.1–1.4)
MONOCYTES NFR BLD AUTO: 7.5 % (ref 3–13)
NEUTROPHILS # BLD AUTO: 4.4 10^3/UL (ref 1.7–8.2)
NEUTS SEG NFR BLD AUTO: 59.8 % (ref 42–78)
PLATELET # BLD: 263 10^3/UL (ref 150–450)
POTASSIUM SERPL-SCNC: 4 MMOL/L (ref 3.6–5)
RBC # BLD AUTO: 3.82 10^6/UL (ref 3.72–5.28)
SODIUM SERPL-SCNC: 140 MMOL/L (ref 137–145)
TOTAL CELLS COUNTED % (AUTO): 100 %
TRIGL SERPL-MCNC: 259 MG/DL (ref ?–150)
VLDLC SERPL CALC-MCNC: 51.8 MG/DL (ref 10–31)
WBC # BLD AUTO: 7.4 10^3/UL (ref 4–10.5)

## 2018-12-27 RX ADMIN — GABAPENTIN SCH MG: 400 CAPSULE ORAL at 21:32

## 2018-12-27 RX ADMIN — OMEGA-3-ACID ETHYL ESTERS SCH GM: 900 CAPSULE ORAL at 12:21

## 2018-12-27 RX ADMIN — VENLAFAXINE HYDROCHLORIDE SCH MG: 75 CAPSULE, EXTENDED RELEASE ORAL at 12:21

## 2018-12-27 RX ADMIN — INSULIN HUMAN PRN UNIT: 100 INJECTION, SOLUTION PARENTERAL at 08:36

## 2018-12-27 RX ADMIN — HEPARIN SODIUM SCH UNIT: 5000 INJECTION, SOLUTION INTRAVENOUS; SUBCUTANEOUS at 15:41

## 2018-12-27 RX ADMIN — Medication SCH: at 15:42

## 2018-12-27 RX ADMIN — ONDANSETRON PRN MG: 4 TABLET, ORALLY DISINTEGRATING ORAL at 21:33

## 2018-12-27 RX ADMIN — MORPHINE SULFATE PRN MG: 10 INJECTION INTRAMUSCULAR; INTRAVENOUS; SUBCUTANEOUS at 00:20

## 2018-12-27 RX ADMIN — DOCUSATE SODIUM SCH: 100 CAPSULE, LIQUID FILLED ORAL at 18:16

## 2018-12-27 RX ADMIN — BUPROPION HYDROCHLORIDE SCH MG: 100 TABLET, FILM COATED ORAL at 21:32

## 2018-12-27 RX ADMIN — GABAPENTIN SCH MG: 400 CAPSULE ORAL at 15:40

## 2018-12-27 RX ADMIN — MORPHINE SULFATE PRN MG: 10 INJECTION INTRAMUSCULAR; INTRAVENOUS; SUBCUTANEOUS at 18:23

## 2018-12-27 RX ADMIN — FAMOTIDINE SCH MG: 20 TABLET, FILM COATED ORAL at 21:33

## 2018-12-27 RX ADMIN — HEPARIN SODIUM SCH UNIT: 5000 INJECTION, SOLUTION INTRAVENOUS; SUBCUTANEOUS at 05:37

## 2018-12-27 RX ADMIN — INSULIN HUMAN PRN UNIT: 100 INJECTION, SOLUTION PARENTERAL at 18:22

## 2018-12-27 RX ADMIN — HEPARIN SODIUM SCH UNIT: 5000 INJECTION, SOLUTION INTRAVENOUS; SUBCUTANEOUS at 21:29

## 2018-12-27 RX ADMIN — FAMOTIDINE SCH MG: 20 TABLET, FILM COATED ORAL at 12:22

## 2018-12-27 RX ADMIN — GABAPENTIN SCH MG: 400 CAPSULE ORAL at 05:37

## 2018-12-27 RX ADMIN — Medication SCH ML: at 21:33

## 2018-12-27 RX ADMIN — DOCUSATE SODIUM SCH: 100 CAPSULE, LIQUID FILLED ORAL at 12:22

## 2018-12-27 RX ADMIN — ASPIRIN SCH MG: 81 TABLET, COATED ORAL at 21:28

## 2018-12-27 RX ADMIN — INSULIN HUMAN PRN UNIT: 100 INJECTION, SOLUTION PARENTERAL at 22:41

## 2018-12-27 RX ADMIN — CYCLOBENZAPRINE HYDROCHLORIDE SCH MG: 10 TABLET, FILM COATED ORAL at 21:29

## 2018-12-27 RX ADMIN — FENOFIBRATE SCH MG: 145 TABLET ORAL at 08:37

## 2018-12-27 RX ADMIN — Medication SCH: at 05:38

## 2018-12-27 RX ADMIN — Medication SCH MG: at 22:40

## 2018-12-27 RX ADMIN — OMEGA-3-ACID ETHYL ESTERS SCH GM: 900 CAPSULE ORAL at 18:22

## 2018-12-27 RX ADMIN — ERTAPENEM SODIUM SCH MLS/HR: 1 INJECTION, POWDER, LYOPHILIZED, FOR SOLUTION INTRAMUSCULAR; INTRAVENOUS at 05:37

## 2018-12-27 RX ADMIN — ONDANSETRON PRN MG: 4 TABLET, ORALLY DISINTEGRATING ORAL at 15:44

## 2018-12-27 RX ADMIN — LOSARTAN POTASSIUM SCH MG: 50 TABLET, FILM COATED ORAL at 08:37

## 2018-12-27 RX ADMIN — ACETAMINOPHEN PRN MG: 325 TABLET ORAL at 15:46

## 2018-12-27 RX ADMIN — BUPROPION HYDROCHLORIDE SCH MG: 100 TABLET, FILM COATED ORAL at 05:37

## 2018-12-27 RX ADMIN — BUPROPION HYDROCHLORIDE SCH MG: 100 TABLET, FILM COATED ORAL at 15:41

## 2018-12-27 NOTE — PDOC PROGRESS REPORT
Subjective


Progress Note for:: 12/27/18


Subjective:: 





53-year-old female admitted for fever and chills initially lactic acid was high 

4.0 improved to 2.2 today at the time of admission she is also have a subjective

fever tachycardia with heart rate of 105 elevated lactic acid levels meeting the

criteria for [facility].  No acute events in the last 24 hours.  T-max is 99.6.





12/27/2018-no acute events in the last 24 hours.  Urine culture showing gram-

negative rods.  T-max is 99.5.  Has any complaints.  Comfortable in the bed.


Reason For Visit: 


SIRS SYNDROME WITH PROBABLE URINARY TRACT








Physical Exam


Vital Signs: 


                                        











Temp Pulse Resp BP Pulse Ox


 


 99.5 F   92   16   105/65   97 


 


 12/27/18 07:21  12/27/18 07:21  12/27/18 07:21  12/27/18 07:21  12/27/18 07:21








                                 Intake & Output











 12/26/18 12/27/18 12/28/18





 06:59 06:59 06:59


 


Intake Total 3494 941 


 


Balance 3494 941 


 


Weight 123.8 kg 123.2 kg 











General appearance: PRESENT: no acute distress


Head exam: PRESENT: atraumatic


Eye exam: PRESENT: PERRLA


Mouth exam: PRESENT: moist


Neck exam: ABSENT: carotid bruit, JVD, lymphadenopathy, thyromegaly


Respiratory exam: PRESENT: clear to auscultation david.  ABSENT: rales, rhonchi, 

wheezes


Cardiovascular exam: PRESENT: RRR.  ABSENT: diastolic murmur, rubs, systolic 

murmur


GI/Abdominal exam: PRESENT: normal bowel sounds, soft.  ABSENT: distended, 

guarding, mass, organolmegaly, rebound, tenderness


Neurological exam: PRESENT: alert, awake, oriented to person, oriented to place,

oriented to time, oriented to situation, CN II-XII grossly intact.  ABSENT: 

motor sensory deficit


Psychiatric exam: PRESENT: appropriate affect, normal mood.  ABSENT: homicidal 

ideation, suicidal ideation





Results


Laboratory Results: 


                                        





                                 12/27/18 04:43 





                                 12/27/18 04:43 





                                        











  12/26/18 12/26/18 12/26/18





  00:30 10:41 16:10


 


WBC   


 


RBC   


 


Hgb   


 


Hct   


 


MCV   


 


MCH   


 


MCHC   


 


RDW   


 


Plt Count   


 


Seg Neutrophils %   


 


Lymphocytes %   


 


Monocytes %   


 


Eosinophils %   


 


Basophils %   


 


Absolute Neutrophils   


 


Absolute Lymphocytes   


 


Absolute Monocytes   


 


Absolute Eosinophils   


 


Absolute Basophils   


 


Sodium   


 


Potassium   


 


Chloride   


 


Carbon Dioxide   


 


Anion Gap   


 


BUN   


 


Creatinine   


 


Est GFR ( Amer)   


 


Est GFR (Non-Af Amer)   


 


Glucose   


 


Lactic Acid   2.1  1.9


 


Calcium   


 


Magnesium   


 


Triglycerides   


 


Cholesterol   


 


LDL Cholesterol Direct   


 


VLDL Cholesterol   


 


HDL Cholesterol   


 


TSH   


 


Urine Color  YELLOW  


 


Urine Appearance  SLIGHTLY-CLOUDY  


 


Urine pH  6.0  


 


Ur Specific Gravity  1.012  


 


Urine Protein  NEGATIVE  


 


Urine Glucose (UA)  >=500 H  


 


Urine Ketones  NEGATIVE  


 


Urine Blood  MODERATE H  


 


Urine Nitrite  NEGATIVE  


 


Ur Leukocyte Esterase  LARGE H  


 


Urine WBC (Auto)  145  


 


Urine RBC (Auto)  19  














  12/26/18 12/27/18 12/27/18





  22:20 04:43 04:43


 


WBC   7.4 


 


RBC   3.82 


 


Hgb   11.0 L 


 


Hct   32.5 L 


 


MCV   85 


 


MCH   28.8 


 


MCHC   33.9 


 


RDW   13.9 


 


Plt Count   263 


 


Seg Neutrophils %   59.8 


 


Lymphocytes %   28.7 


 


Monocytes %   7.5 


 


Eosinophils %   3.1 


 


Basophils %   0.9 


 


Absolute Neutrophils   4.4 


 


Absolute Lymphocytes   2.1 


 


Absolute Monocytes   0.5 


 


Absolute Eosinophils   0.2 


 


Absolute Basophils   0.1 


 


Sodium    140.0


 


Potassium    4.0


 


Chloride    102


 


Carbon Dioxide    29


 


Anion Gap    9


 


BUN    11


 


Creatinine    0.65


 


Est GFR ( Amer)    > 60


 


Est GFR (Non-Af Amer)    > 60


 


Glucose    209 H


 


Lactic Acid  1.3  


 


Calcium    8.9


 


Magnesium    1.8


 


Triglycerides    259 H


 


Cholesterol    107.72


 


LDL Cholesterol Direct    74


 


VLDL Cholesterol    51.8 H


 


HDL Cholesterol    18 L


 


TSH   


 


Urine Color   


 


Urine Appearance   


 


Urine pH   


 


Ur Specific Gravity   


 


Urine Protein   


 


Urine Glucose (UA)   


 


Urine Ketones   


 


Urine Blood   


 


Urine Nitrite   


 


Ur Leukocyte Esterase   


 


Urine WBC (Auto)   


 


Urine RBC (Auto)   














  12/27/18





  04:43


 


WBC 


 


RBC 


 


Hgb 


 


Hct 


 


MCV 


 


MCH 


 


MCHC 


 


RDW 


 


Plt Count 


 


Seg Neutrophils % 


 


Lymphocytes % 


 


Monocytes % 


 


Eosinophils % 


 


Basophils % 


 


Absolute Neutrophils 


 


Absolute Lymphocytes 


 


Absolute Monocytes 


 


Absolute Eosinophils 


 


Absolute Basophils 


 


Sodium 


 


Potassium 


 


Chloride 


 


Carbon Dioxide 


 


Anion Gap 


 


BUN 


 


Creatinine 


 


Est GFR ( Amer) 


 


Est GFR (Non-Af Amer) 


 


Glucose 


 


Lactic Acid 


 


Calcium 


 


Magnesium 


 


Triglycerides 


 


Cholesterol 


 


LDL Cholesterol Direct 


 


VLDL Cholesterol 


 


HDL Cholesterol 


 


TSH  2.89


 


Urine Color 


 


Urine Appearance 


 


Urine pH 


 


Ur Specific Gravity 


 


Urine Protein 


 


Urine Glucose (UA) 


 


Urine Ketones 


 


Urine Blood 


 


Urine Nitrite 


 


Ur Leukocyte Esterase 


 


Urine WBC (Auto) 


 


Urine RBC (Auto) 











Impressions: 


                                        





Chest X-Ray  12/26/18 00:25


IMPRESSION:


 


No acute cardiopulmonary process


 


 


copyright 2011 [employer]- All Rights Reserved


 














Assessment & Plan





- Diagnosis


(1) SIRS (systemic inflammatory response syndrome)


Is this a current diagnosis for this admission?: Yes   


Plan: 


With lactic acidosis, mild leukocytosis, minimal tachycardia and a possible 

urinary tract infection, SIRS criteria have been met.  However this patient is 

very unlikely to have sepsis, so much so that at this point it can be 

affirmatively stated that sepsis has been ruled out.  She will be treated with 

broad-spectrum antibiotics for her role urinary tract infection and serial 

lactic acid measurements will be obtained her vital signs will be observed and 

her overall clinical course will be monitored.  Serial laboratory evaluations of

her metabolic profile and CBC will be obtained.








12/26/2018-temperature today is 99.6.  Patient is on Invanz.  Lactic acid level 

came down to 2.2.  We are going to continue to follow the CBC and chemistry.  

Cultures urine cultures are pending.  Plan is to continue the present 

management.





12/27/2018 T-max is 99.5.  Patient is on Invanz.  Level is 1.3.  WBC 7.4.  Urine

culture showing gram-negative rods.  Plan is to continue the present management.








(2) UTI (urinary tract infection)


Qualifiers: 


   Urinary tract infection type: acute pyelonephritis   Qualified Code(s): N10 -

Acute pyelonephritis   


Is this a current diagnosis for this admission?: Yes   


Plan: 


Patient was noted to have acute right CVA tenderness far greater than the left 

giving suspicion to a distinct possibility of an acute right pyelonephritis.  

Her urine does show significant pyuria based on her positive leukocyte esterase 

however a moderately positive blood chemical reaction is not necessarily 

indicative of hematuria.  She will be treated with broad-spectrum antibiotics 

until urine culture results are available and spectrum of the antibiotic therapy

can be narrowed as appropriate.





12/26/2018-on examination there is no right CVA tenderness.  Urinalysis shows 

pyuria with positive leukocyte esterase.  Cultures are pending.  Patient is on 

broad-spectrum antibiotics.  Is to continue the present management and wait for 

the urine cultures.








12/27/2018-urine culture showing gram-negative rods waiting for complete report 

patient is on Invanz plan is to continue the current management.








(3) Diabetes mellitus type 2 in obese


Is this a current diagnosis for this admission?: Yes   


Plan: 


Patient will be treated with her usual diabetic therapy with the addition of a 

sliding scale insulin coverage for hyperglycemia.  Hemoglobin A1c will be 

obtained to assess the efficacy of the prior therapy and adjustments will be 

made as appropriate.








12/26/2018-started on insulin sliding scale.  Blood sugars are 253 today.  

Patient is on metformin at home.  We are going to hold metformin.  Patient is 

also on Victoza.  We are going to hold Victoza during this hospital stay.  

Hemoglobin A1c is pending.





12/27/2018 hemoglobin A1c is 8.7 patient's latest blood sugar is 208 she is on 

insulin sliding scale and also on Lantus 10 units twice daily I am going to 

increase the Lantus to 20 units twice a day.  Besides weight loss and lifestyle 

modifications discussed with the patient.








(4) HTN (hypertension)


Qualifiers: 


   Hypertension type: essential hypertension   Qualified Code(s): I10 - 

Essential (primary) hypertension   


Is this a current diagnosis for this admission?: Yes   


Plan: 


Patient will be continued on her current antihypertensive regiment once her home

meds have been established.  Efficacy of this therapy will be assessed over 

hospital course with serial evaluations of her vital signs including blood 

pressure.








12/26/2018 blood pressure is 120/66.  Plan for today is to discontinue IV fluids

and restart on losartan.  pT is on 50 mg of losartan daily at home.





12/27/2018 blood pressure today is 105/67 patient is asymptomatic.  Patient is 

on losartan 50 mg p.o. daily.  








(5) Morbid obesity with BMI of 45.0-49.9, adult


Is this a current diagnosis for this admission?: Yes   


Plan: 


12/26/2018-diet exercise weight loss and lifestyle modifications are advised.  

Dietary consult was requested.  BMI is more than 50.





12/27/2018-plan is to continue the present management dietary consult was 

requested.








- Time


Time Spent with patient: 15-24 minutes


Medications reviewed and adjusted accordingly: Yes


Anticipated discharge: Home

## 2018-12-28 LAB
ADD MANUAL DIFF: NO
ANION GAP SERPL CALC-SCNC: 13 MMOL/L (ref 5–19)
BASOPHILS # BLD AUTO: 0.1 10^3/UL (ref 0–0.2)
BASOPHILS NFR BLD AUTO: 1.1 % (ref 0–2)
BUN SERPL-MCNC: 15 MG/DL (ref 7–20)
CALCIUM: 9.1 MG/DL (ref 8.4–10.2)
CHLORIDE SERPL-SCNC: 101 MMOL/L (ref 98–107)
CO2 SERPL-SCNC: 26 MMOL/L (ref 22–30)
EOSINOPHIL # BLD AUTO: 0.2 10^3/UL (ref 0–0.6)
EOSINOPHIL NFR BLD AUTO: 4 % (ref 0–6)
ERYTHROCYTE [DISTWIDTH] IN BLOOD BY AUTOMATED COUNT: 13.8 % (ref 11.5–14)
GLUCOSE SERPL-MCNC: 185 MG/DL (ref 75–110)
HCT VFR BLD CALC: 32.4 % (ref 36–47)
HGB BLD-MCNC: 10.8 G/DL (ref 12–15.5)
LYMPHOCYTES # BLD AUTO: 2.2 10^3/UL (ref 0.5–4.7)
LYMPHOCYTES NFR BLD AUTO: 37.4 % (ref 13–45)
MCH RBC QN AUTO: 28.4 PG (ref 27–33.4)
MCHC RBC AUTO-ENTMCNC: 33.4 G/DL (ref 32–36)
MCV RBC AUTO: 85 FL (ref 80–97)
MONOCYTES # BLD AUTO: 0.5 10^3/UL (ref 0.1–1.4)
MONOCYTES NFR BLD AUTO: 8.8 % (ref 3–13)
NEUTROPHILS # BLD AUTO: 2.8 10^3/UL (ref 1.7–8.2)
NEUTS SEG NFR BLD AUTO: 48.7 % (ref 42–78)
PLATELET # BLD: 289 10^3/UL (ref 150–450)
POTASSIUM SERPL-SCNC: 4.2 MMOL/L (ref 3.6–5)
RBC # BLD AUTO: 3.81 10^6/UL (ref 3.72–5.28)
SODIUM SERPL-SCNC: 139.8 MMOL/L (ref 137–145)
TOTAL CELLS COUNTED % (AUTO): 100 %
WBC # BLD AUTO: 5.8 10^3/UL (ref 4–10.5)

## 2018-12-28 RX ADMIN — GABAPENTIN SCH MG: 400 CAPSULE ORAL at 16:00

## 2018-12-28 RX ADMIN — MORPHINE SULFATE PRN MG: 10 INJECTION INTRAMUSCULAR; INTRAVENOUS; SUBCUTANEOUS at 13:04

## 2018-12-28 RX ADMIN — INSULIN HUMAN PRN UNIT: 100 INJECTION, SOLUTION PARENTERAL at 12:55

## 2018-12-28 RX ADMIN — FENOFIBRATE SCH MG: 145 TABLET ORAL at 09:25

## 2018-12-28 RX ADMIN — Medication SCH ML: at 16:01

## 2018-12-28 RX ADMIN — BUPROPION HYDROCHLORIDE SCH MG: 100 TABLET, FILM COATED ORAL at 06:06

## 2018-12-28 RX ADMIN — VENLAFAXINE HYDROCHLORIDE SCH MG: 75 CAPSULE, EXTENDED RELEASE ORAL at 09:07

## 2018-12-28 RX ADMIN — LOSARTAN POTASSIUM SCH MG: 25 TABLET, FILM COATED ORAL at 09:26

## 2018-12-28 RX ADMIN — GABAPENTIN SCH MG: 400 CAPSULE ORAL at 21:58

## 2018-12-28 RX ADMIN — GABAPENTIN SCH MG: 400 CAPSULE ORAL at 06:06

## 2018-12-28 RX ADMIN — BUPROPION HYDROCHLORIDE SCH MG: 100 TABLET, FILM COATED ORAL at 21:58

## 2018-12-28 RX ADMIN — HEPARIN SODIUM SCH UNIT: 5000 INJECTION, SOLUTION INTRAVENOUS; SUBCUTANEOUS at 21:59

## 2018-12-28 RX ADMIN — MORPHINE SULFATE PRN MG: 10 INJECTION INTRAMUSCULAR; INTRAVENOUS; SUBCUTANEOUS at 17:19

## 2018-12-28 RX ADMIN — DOCUSATE SODIUM SCH: 100 CAPSULE, LIQUID FILLED ORAL at 18:30

## 2018-12-28 RX ADMIN — OMEGA-3-ACID ETHYL ESTERS SCH GM: 900 CAPSULE ORAL at 17:21

## 2018-12-28 RX ADMIN — ERTAPENEM SODIUM SCH MLS/HR: 1 INJECTION, POWDER, LYOPHILIZED, FOR SOLUTION INTRAMUSCULAR; INTRAVENOUS at 06:02

## 2018-12-28 RX ADMIN — BUPROPION HYDROCHLORIDE SCH MG: 100 TABLET, FILM COATED ORAL at 16:00

## 2018-12-28 RX ADMIN — LOSARTAN POTASSIUM SCH: 50 TABLET, FILM COATED ORAL at 09:25

## 2018-12-28 RX ADMIN — CYCLOBENZAPRINE HYDROCHLORIDE SCH MG: 10 TABLET, FILM COATED ORAL at 21:58

## 2018-12-28 RX ADMIN — OMEGA-3-ACID ETHYL ESTERS SCH GM: 900 CAPSULE ORAL at 09:26

## 2018-12-28 RX ADMIN — Medication SCH MG: at 21:59

## 2018-12-28 RX ADMIN — DOCUSATE SODIUM SCH: 100 CAPSULE, LIQUID FILLED ORAL at 09:25

## 2018-12-28 RX ADMIN — Medication SCH: at 21:56

## 2018-12-28 RX ADMIN — FAMOTIDINE SCH MG: 20 TABLET, FILM COATED ORAL at 21:58

## 2018-12-28 RX ADMIN — Medication SCH ML: at 21:59

## 2018-12-28 RX ADMIN — HEPARIN SODIUM SCH UNIT: 5000 INJECTION, SOLUTION INTRAVENOUS; SUBCUTANEOUS at 06:06

## 2018-12-28 RX ADMIN — INSULIN GLARGINE SCH UNIT: 100 INJECTION, SOLUTION SUBCUTANEOUS at 09:26

## 2018-12-28 RX ADMIN — ASPIRIN SCH MG: 81 TABLET, COATED ORAL at 21:58

## 2018-12-28 RX ADMIN — INSULIN GLARGINE SCH UNIT: 100 INJECTION, SOLUTION SUBCUTANEOUS at 17:20

## 2018-12-28 RX ADMIN — MORPHINE SULFATE PRN MG: 10 INJECTION INTRAMUSCULAR; INTRAVENOUS; SUBCUTANEOUS at 22:55

## 2018-12-28 RX ADMIN — HEPARIN SODIUM SCH UNIT: 5000 INJECTION, SOLUTION INTRAVENOUS; SUBCUTANEOUS at 16:01

## 2018-12-28 RX ADMIN — FAMOTIDINE SCH MG: 20 TABLET, FILM COATED ORAL at 09:26

## 2018-12-28 RX ADMIN — MORPHINE SULFATE PRN MG: 10 INJECTION INTRAMUSCULAR; INTRAVENOUS; SUBCUTANEOUS at 06:09

## 2018-12-28 RX ADMIN — MORPHINE SULFATE PRN MG: 10 INJECTION INTRAMUSCULAR; INTRAVENOUS; SUBCUTANEOUS at 00:12

## 2018-12-28 NOTE — PDOC PROGRESS REPORT
Subjective


Progress Note for:: 12/28/18


Subjective:: 





53-year-old female admitted for fever and chills initially lactic acid was high 

4.0 improved to 2.2 today at the time of admission she is also have a subjective

fever tachycardia with heart rate of 105 elevated lactic acid levels meeting the

criteria for Sirs.  No acute events in the last 24 hours.  T-max is 99.6.





12/27/2018-no acute events in the last 24 hours.  Urine culture showing gram-

negative rods.  T-max is 99.5.  Has any complaints.  Comfortable in the bed.





12/28/2018 no acute events in the last 24 hours.  Patient's denies any 

complaints.  She is afebrile.


Reason For Visit: 


SIRS SYNDROME WITH PROBABLE URINARY TRACT








Physical Exam


Vital Signs: 


                                        











Temp Pulse Resp BP Pulse Ox


 


 98.5 F   88   16   101/55 L  97 


 


 12/28/18 03:52  12/28/18 07:00  12/28/18 03:52  12/28/18 03:52  12/28/18 03:52








                                 Intake & Output











 12/27/18 12/28/18 12/29/18





 06:59 06:59 06:59


 


Intake Total 941 2280 50


 


Balance 941 2280 50


 


Weight 123.2 kg 123.6 kg 











General appearance: PRESENT: other - Morbidly obese female not in distress.


Head exam: PRESENT: atraumatic


Eye exam: PRESENT: PERRLA


Neck exam: ABSENT: carotid bruit, JVD, lymphadenopathy, thyromegaly


Respiratory exam: PRESENT: clear to auscultation david.  ABSENT: rales, rhonchi, 

wheezes


Cardiovascular exam: PRESENT: RRR.  ABSENT: diastolic murmur, rubs, systolic 

murmur


GI/Abdominal exam: PRESENT: normal bowel sounds, soft.  ABSENT: tenderness


Neurological exam: PRESENT: alert, awake, oriented to person, oriented to place,

oriented to time, oriented to situation, CN II-XII grossly intact.  ABSENT: 

motor sensory deficit


Psychiatric exam: PRESENT: appropriate affect, normal mood.  ABSENT: homicidal 

ideation, suicidal ideation





Results


Laboratory Results: 


                                        





                                 12/28/18 04:21 





                                 12/28/18 04:21 





                                        











  12/26/18 12/28/18 12/28/18





  00:30 04:21 04:21


 


WBC   5.8 


 


RBC   3.81 


 


Hgb   10.8 L 


 


Hct   32.4 L 


 


MCV   85 


 


MCH   28.4 


 


MCHC   33.4 


 


RDW   13.8 


 


Plt Count   289 


 


Seg Neutrophils %   48.7 


 


Lymphocytes %   37.4 


 


Monocytes %   8.8 


 


Eosinophils %   4.0 


 


Basophils %   1.1 


 


Absolute Neutrophils   2.8 


 


Absolute Lymphocytes   2.2 


 


Absolute Monocytes   0.5 


 


Absolute Eosinophils   0.2 


 


Absolute Basophils   0.1 


 


Sodium    139.8


 


Potassium    4.2


 


Chloride    101


 


Carbon Dioxide    26


 


Anion Gap    13


 


BUN    15


 


Creatinine    0.69


 


Est GFR ( Amer)    > 60


 


Est GFR (Non-Af Amer)    > 60


 


Glucose    185 H


 


Calcium    9.1


 


Magnesium    1.8


 


Urine Color  YELLOW  


 


Urine Appearance  SLIGHTLY-CLOUDY  


 


Urine pH  6.0  


 


Ur Specific Gravity  1.012  


 


Urine Protein  NEGATIVE  


 


Urine Glucose (UA)  >=500 H  


 


Urine Ketones  NEGATIVE  


 


Urine Blood  MODERATE H  


 


Urine Nitrite  NEGATIVE  


 


Ur Leukocyte Esterase  LARGE H  


 


Urine WBC (Auto)  145  


 


Urine RBC (Auto)  19  








                                        





12/26/18 00:30   Clean Catch Midstream   Urine Culture - Final


                            Escherichia Coli








Impressions: 


                                        





Chest X-Ray  12/26/18 00:25


IMPRESSION:


 


No acute cardiopulmonary process


 


 


copyright 2011 [employer]- All Rights Reserved


 














Assessment & Plan





- Diagnosis


(1) SIRS (systemic inflammatory response syndrome)


Is this a current diagnosis for this admission?: Yes   


Plan: 


With lactic acidosis, mild leukocytosis, minimal tachycardia and a possible 

urinary tract infection, SIRS criteria have been met.  However this patient is 

very unlikely to have sepsis, so much so that at this point it can be 

affirmatively stated that sepsis has been ruled out.  She will be treated with 

broad-spectrum antibiotics for her role urinary tract infection and serial 

lactic acid measurements will be obtained her vital signs will be observed and 

her overall clinical course will be monitored.  Serial laboratory evaluations of

her metabolic profile and CBC will be obtained.








12/26/2018-temperature today is 99.6.  Patient is on Invanz.  Lactic acid level 

came down to 2.2.  We are going to continue to follow the CBC and chemistry.  

Cultures urine cultures are pending.  Plan is to continue the present 

management.





12/27/2018 T-max is 99.5.  Patient is on Invanz.  lactic acid Level is 1.3.  WBC

7.4.  Urine culture showing gram-negative rods.  Plan is to continue the present

management.





12/28/2018 T-max is 98.5.  Urine culture came back positive for E. coli.  Blood 

cultures positive for gram-negative rods.  Patient is on Invanz.  Lactic acid 

level is 1.3.  Plan is to continue the current management.








(2) UTI (urinary tract infection)


Qualifiers: 


   Urinary tract infection type: acute pyelonephritis   Qualified Code(s): N10 -

Acute pyelonephritis   


Is this a current diagnosis for this admission?: Yes   


Plan: 


Patient was noted to have acute right CVA tenderness far greater than the left 

giving suspicion to a distinct possibility of an acute right pyelonephritis.  

Her urine does show significant pyuria based on her positive leukocyte esterase 

however a moderately positive blood chemical reaction is not necessarily 

indicative of hematuria.  She will be treated with broad-spectrum antibiotics 

until urine culture results are available and spectrum of the antibiotic therapy

can be narrowed as appropriate.





12/26/2018-on examination there is no right CVA tenderness.  Urinalysis shows 

pyuria with positive leukocyte esterase.  Cultures are pending.  Patient is on 

broad-spectrum antibiotics.  Is to continue the present management and wait for 

the urine cultures.








12/27/2018-urine culture showing gram-negative rods waiting for complete report 

patient is on Invanz plan is to continue the current management.





12/28/2018 urine culture positive for E. coli and Invanz.  And is to continue 

the present management.








(3) Diabetes mellitus type 2 in obese


Is this a current diagnosis for this admission?: Yes   


Plan: 


Patient will be treated with her usual diabetic therapy with the addition of a 

sliding scale insulin coverage for hyperglycemia.  Hemoglobin A1c will be 

obtained to assess the efficacy of the prior therapy and adjustments will be 

made as appropriate.








12/26/2018-started on insulin sliding scale.  Blood sugars are 253 today.  

Patient is on metformin at home.  We are going to hold metformin.  Patient is 

also on Victoza.  We are going to hold Victoza during this hospital stay.  

Hemoglobin A1c is pending.





12/27/2018 hemoglobin A1c is 8.7 patient's latest blood sugar is 208 she is on 

insulin sliding scale and also on Lantus 10 units twice daily I am going to 

increase the Lantus to 20 units twice a day.  Besides weight loss and lifestyle 

modifications discussed with the patient.








12/28/2018 hemoglobin A1c is 8.7 today's blood sugar is 202.  Blood sugars are 

running more than 200s.  I am going to increase the Lantus to 30 units twice a 

day.  Dietary consult was requested.  She is also interested in speaking to the 

dietitian.








(4) HTN (hypertension)


Qualifiers: 


   Hypertension type: essential hypertension   Qualified Code(s): I10 - 

Essential (primary) hypertension   


Is this a current diagnosis for this admission?: Yes   


Plan: 


Patient will be continued on her current antihypertensive regiment once her home

meds have been established.  Efficacy of this therapy will be assessed over hos

pital course with serial evaluations of her vital signs including blood 

pressure.








12/26/2018 blood pressure is 120/66.  Plan for today is to discontinue IV fluids

and restart on losartan.  pT is on 50 mg of losartan daily at home.





12/27/2018 blood pressure today is 105/67 patient is asymptomatic.  Patient is 

on losartan 50 mg p.o. daily.  





12 28 2018-blood pressure is 101/55 patient is asymptomatic.  Rate is 82.  He is

on losartan 50 mg p.o. daily.  Plan is to continue the present management.








(5) Morbid obesity with BMI of 45.0-49.9, adult


Is this a current diagnosis for this admission?: Yes   


Plan: 


12/26/2018-diet exercise weight loss and lifestyle modifications are advised.  

Dietary consult was requested.  BMI is more than 50.





12/27/2018-plan is to continue the present management dietary consult was 

requested.





12/28/2018-diet exercise weight loss and life style modifications were 

discussed.  Dietitian consult was requested.








- Time


Time Spent with patient: 15-24 minutes


Medications reviewed and adjusted accordingly: Yes


Anticipated discharge: Home

## 2018-12-29 LAB
ADD MANUAL DIFF: NO
ANION GAP SERPL CALC-SCNC: 12 MMOL/L (ref 5–19)
BASOPHILS # BLD AUTO: 0.1 10^3/UL (ref 0–0.2)
BASOPHILS NFR BLD AUTO: 1.4 % (ref 0–2)
BUN SERPL-MCNC: 16 MG/DL (ref 7–20)
CALCIUM: 9.4 MG/DL (ref 8.4–10.2)
CHLORIDE SERPL-SCNC: 102 MMOL/L (ref 98–107)
CO2 SERPL-SCNC: 26 MMOL/L (ref 22–30)
EOSINOPHIL # BLD AUTO: 0.2 10^3/UL (ref 0–0.6)
EOSINOPHIL NFR BLD AUTO: 4.1 % (ref 0–6)
ERYTHROCYTE [DISTWIDTH] IN BLOOD BY AUTOMATED COUNT: 13.3 % (ref 11.5–14)
GLUCOSE SERPL-MCNC: 190 MG/DL (ref 75–110)
HCT VFR BLD CALC: 34.1 % (ref 36–47)
HGB BLD-MCNC: 11.4 G/DL (ref 12–15.5)
LYMPHOCYTES # BLD AUTO: 2.4 10^3/UL (ref 0.5–4.7)
LYMPHOCYTES NFR BLD AUTO: 42.6 % (ref 13–45)
MCH RBC QN AUTO: 28.4 PG (ref 27–33.4)
MCHC RBC AUTO-ENTMCNC: 33.3 G/DL (ref 32–36)
MCV RBC AUTO: 86 FL (ref 80–97)
MONOCYTES # BLD AUTO: 0.5 10^3/UL (ref 0.1–1.4)
MONOCYTES NFR BLD AUTO: 8.7 % (ref 3–13)
NEUTROPHILS # BLD AUTO: 2.4 10^3/UL (ref 1.7–8.2)
NEUTS SEG NFR BLD AUTO: 43.2 % (ref 42–78)
PLATELET # BLD: 294 10^3/UL (ref 150–450)
POTASSIUM SERPL-SCNC: 4.1 MMOL/L (ref 3.6–5)
RBC # BLD AUTO: 3.99 10^6/UL (ref 3.72–5.28)
SODIUM SERPL-SCNC: 140 MMOL/L (ref 137–145)
TOTAL CELLS COUNTED % (AUTO): 100 %
WBC # BLD AUTO: 5.6 10^3/UL (ref 4–10.5)

## 2018-12-29 RX ADMIN — BUPROPION HYDROCHLORIDE SCH MG: 100 TABLET, FILM COATED ORAL at 13:26

## 2018-12-29 RX ADMIN — BUPROPION HYDROCHLORIDE SCH MG: 100 TABLET, FILM COATED ORAL at 05:29

## 2018-12-29 RX ADMIN — DOCUSATE SODIUM SCH: 100 CAPSULE, LIQUID FILLED ORAL at 09:27

## 2018-12-29 RX ADMIN — OMEGA-3-ACID ETHYL ESTERS SCH GM: 900 CAPSULE ORAL at 17:24

## 2018-12-29 RX ADMIN — ERTAPENEM SODIUM SCH MLS/HR: 1 INJECTION, POWDER, LYOPHILIZED, FOR SOLUTION INTRAMUSCULAR; INTRAVENOUS at 05:29

## 2018-12-29 RX ADMIN — GABAPENTIN SCH MG: 400 CAPSULE ORAL at 05:29

## 2018-12-29 RX ADMIN — DOCUSATE SODIUM SCH: 100 CAPSULE, LIQUID FILLED ORAL at 17:19

## 2018-12-29 RX ADMIN — CYCLOBENZAPRINE HYDROCHLORIDE SCH MG: 10 TABLET, FILM COATED ORAL at 21:21

## 2018-12-29 RX ADMIN — MORPHINE SULFATE PRN MG: 10 INJECTION INTRAMUSCULAR; INTRAVENOUS; SUBCUTANEOUS at 03:23

## 2018-12-29 RX ADMIN — FAMOTIDINE SCH MG: 20 TABLET, FILM COATED ORAL at 21:21

## 2018-12-29 RX ADMIN — INSULIN HUMAN PRN UNIT: 100 INJECTION, SOLUTION PARENTERAL at 09:26

## 2018-12-29 RX ADMIN — Medication SCH MG: at 21:21

## 2018-12-29 RX ADMIN — ASPIRIN SCH MG: 81 TABLET, COATED ORAL at 21:21

## 2018-12-29 RX ADMIN — INSULIN GLARGINE SCH UNIT: 100 INJECTION, SOLUTION SUBCUTANEOUS at 17:24

## 2018-12-29 RX ADMIN — GABAPENTIN SCH MG: 400 CAPSULE ORAL at 21:21

## 2018-12-29 RX ADMIN — Medication SCH ML: at 05:29

## 2018-12-29 RX ADMIN — HEPARIN SODIUM SCH UNIT: 5000 INJECTION, SOLUTION INTRAVENOUS; SUBCUTANEOUS at 21:21

## 2018-12-29 RX ADMIN — INSULIN HUMAN PRN UNIT: 100 INJECTION, SOLUTION PARENTERAL at 01:26

## 2018-12-29 RX ADMIN — GABAPENTIN SCH MG: 400 CAPSULE ORAL at 13:26

## 2018-12-29 RX ADMIN — INSULIN HUMAN PRN UNIT: 100 INJECTION, SOLUTION PARENTERAL at 23:16

## 2018-12-29 RX ADMIN — MORPHINE SULFATE PRN MG: 10 INJECTION INTRAMUSCULAR; INTRAVENOUS; SUBCUTANEOUS at 13:36

## 2018-12-29 RX ADMIN — INSULIN HUMAN PRN UNIT: 100 INJECTION, SOLUTION PARENTERAL at 12:04

## 2018-12-29 RX ADMIN — HEPARIN SODIUM SCH UNIT: 5000 INJECTION, SOLUTION INTRAVENOUS; SUBCUTANEOUS at 05:29

## 2018-12-29 RX ADMIN — MORPHINE SULFATE PRN MG: 10 INJECTION INTRAMUSCULAR; INTRAVENOUS; SUBCUTANEOUS at 18:02

## 2018-12-29 RX ADMIN — FENOFIBRATE SCH MG: 145 TABLET ORAL at 08:45

## 2018-12-29 RX ADMIN — MORPHINE SULFATE PRN MG: 10 INJECTION INTRAMUSCULAR; INTRAVENOUS; SUBCUTANEOUS at 23:15

## 2018-12-29 RX ADMIN — OMEGA-3-ACID ETHYL ESTERS SCH GM: 900 CAPSULE ORAL at 09:27

## 2018-12-29 RX ADMIN — LOSARTAN POTASSIUM SCH MG: 25 TABLET, FILM COATED ORAL at 09:27

## 2018-12-29 RX ADMIN — Medication SCH: at 21:31

## 2018-12-29 RX ADMIN — FAMOTIDINE SCH MG: 20 TABLET, FILM COATED ORAL at 09:27

## 2018-12-29 RX ADMIN — HEPARIN SODIUM SCH UNIT: 5000 INJECTION, SOLUTION INTRAVENOUS; SUBCUTANEOUS at 13:26

## 2018-12-29 RX ADMIN — VENLAFAXINE HYDROCHLORIDE SCH MG: 75 CAPSULE, EXTENDED RELEASE ORAL at 09:27

## 2018-12-29 RX ADMIN — INSULIN HUMAN PRN UNIT: 100 INJECTION, SOLUTION PARENTERAL at 17:24

## 2018-12-29 RX ADMIN — BUPROPION HYDROCHLORIDE SCH MG: 100 TABLET, FILM COATED ORAL at 21:21

## 2018-12-29 RX ADMIN — Medication SCH ML: at 13:26

## 2018-12-29 NOTE — PDOC PROGRESS REPORT
Subjective


Progress Note for:: 12/29/18


Subjective:: 





53-year-old female admitted for fever and chills initially lactic acid was high 

4.0 improved to 2.2 today at the time of admission she is also have a subjective

fever tachycardia with heart rate of 105 elevated lactic acid levels meeting the

criteria for Sirs.  No acute events in the last 24 hours.  T-max is 99.6.





12/27/2018-no acute events in the last 24 hours.  Urine culture showing gram-

negative rods.  T-max is 99.5.  Has any complaints.  Comfortable in the bed.





12/28/2018 no acute events in the last 24 hours.  Patient's denies any 

complaints.  She is afebrile.








12/29/2018 no acute events in the last 24 hours.  Patient is afebrile.  

Temperature is 98.2.  Blood pressure is 120/64.  Patient denies any complaints.


Reason For Visit: 


SIRS SYNDROME WITH PROBABLE URINARY TRACT








Physical Exam


Vital Signs: 


                                        











Temp Pulse Resp BP Pulse Ox


 


 98.2 F   93   16   120/64   93 


 


 12/29/18 08:25  12/29/18 08:25  12/29/18 08:25  12/29/18 08:25  12/29/18 08:25








                                 Intake & Output











 12/28/18 12/29/18 12/30/18





 06:59 06:59 06:59


 


Intake Total 2280 1594 


 


Balance 2280 1594 


 


Weight 123.6 kg 125.4 kg 











General appearance: PRESENT: other - Morbidly obese female


Head exam: PRESENT: atraumatic


Eye exam: PRESENT: PERRLA


Mouth exam: PRESENT: moist


Neck exam: ABSENT: carotid bruit, JVD, lymphadenopathy, thyromegaly


Respiratory exam: PRESENT: clear to auscultation david.  ABSENT: rales, rhonchi, 

wheezes


Cardiovascular exam: PRESENT: RRR.  ABSENT: diastolic murmur, rubs, systolic 

murmur


GI/Abdominal exam: PRESENT: normal bowel sounds, soft.  ABSENT: tenderness


Extremities exam: PRESENT: full ROM.  ABSENT: calf tenderness, clubbing, pedal e

ricardo


Neurological exam: PRESENT: alert, awake, oriented to person, oriented to place,

oriented to time, oriented to situation, CN II-XII grossly intact.  ABSENT: 

motor sensory deficit


Psychiatric exam: PRESENT: appropriate affect, normal mood.  ABSENT: homicidal 

ideation, suicidal ideation





Results


Laboratory Results: 


                                        





                                 12/29/18 06:53 





                                 12/29/18 06:53 





                                        











  12/29/18 12/29/18





  06:53 06:53


 


WBC  5.6 


 


RBC  3.99 


 


Hgb  11.4 L 


 


Hct  34.1 L 


 


MCV  86 


 


MCH  28.4 


 


MCHC  33.3 


 


RDW  13.3 


 


Plt Count  294 


 


Seg Neutrophils %  43.2 


 


Lymphocytes %  42.6 


 


Monocytes %  8.7 


 


Eosinophils %  4.1 


 


Basophils %  1.4 


 


Absolute Neutrophils  2.4 


 


Absolute Lymphocytes  2.4 


 


Absolute Monocytes  0.5 


 


Absolute Eosinophils  0.2 


 


Absolute Basophils  0.1 


 


Sodium   140.0


 


Potassium   4.1


 


Chloride   102


 


Carbon Dioxide   26


 


Anion Gap   12


 


BUN   16


 


Creatinine   0.65


 


Est GFR ( Amer)   > 60


 


Est GFR (Non-Af Amer)   > 60


 


Glucose   190 H


 


Calcium   9.4


 


Magnesium   1.8








                                        





12/26/18 00:30   Clean Catch Midstream   Urine Culture - Final


                            Escherichia Coli








Impressions: 


                                        





Chest X-Ray  12/26/18 00:25


IMPRESSION:


 


No acute cardiopulmonary process


 


 


copyright 2011 [employer]- All Rights Reserved


 














Assessment & Plan





- Diagnosis


(1) SIRS (systemic inflammatory response syndrome)


Is this a current diagnosis for this admission?: Yes   


Plan: 


With lactic acidosis, mild leukocytosis, minimal tachycardia and a possible 

urinary tract infection, SIRS criteria have been met.  However this patient is 

very unlikely to have sepsis, so much so that at this point it can be 

affirmatively stated that sepsis has been ruled out.  She will be treated with 

broad-spectrum antibiotics for her role urinary tract infection and serial 

lactic acid measurements will be obtained her vital signs will be observed and 

her overall clinical course will be monitored.  Serial laboratory evaluations of

her metabolic profile and CBC will be obtained.








12/26/2018-temperature today is 99.6.  Patient is on Invanz.  Lactic acid level 

came down to 2.2.  We are going to continue to follow the CBC and chemistry.  

Cultures urine cultures are pending.  Plan is to continue the present 

management.





12/27/2018 T-max is 99.5.  Patient is on Invanz.  lactic acid Level is 1.3.  WBC

7.4.  Urine culture showing gram-negative rods.  Plan is to continue the present

management.





12/28/2018 T-max is 98.5.  Urine culture came back positive for E. coli.  Blood 

cultures positive for gram-negative rods.  Patient is on Invanz.  Lactic acid 

level is 1.3.  Plan is to continue the current management.





12/29/2018 T-max is 98.2.  Blood cultures showing gram-negative rods, urine 

culture is positive for E. coli.  Patient is on Invanz.  Latest lactic acid 

level is 1.3.  Hypotension resolved.  plan is to continue 1 more day of IV 

antibiotic therapy .  Probably I am going to discharge her home tomorrow on p.o.

antibiotics.








(2) UTI (urinary tract infection)


Qualifiers: 


   Urinary tract infection type: acute pyelonephritis   Qualified Code(s): N10 -

Acute pyelonephritis   


Is this a current diagnosis for this admission?: Yes   


Plan: 


Patient was noted to have acute right CVA tenderness far greater than the left 

giving suspicion to a distinct possibility of an acute right pyelonephritis.  

Her urine does show significant pyuria based on her positive leukocyte esterase 

however a moderately positive blood chemical reaction is not necessarily 

indicative of hematuria.  She will be treated with broad-spectrum antibiotics 

until urine culture results are available and spectrum of the antibiotic therapy

can be narrowed as appropriate.





12/26/2018-on examination there is no right CVA tenderness.  Urinalysis shows 

pyuria with positive leukocyte esterase.  Cultures are pending.  Patient is on 

broad-spectrum antibiotics.  Is to continue the present management and wait for 

the urine cultures.








12/27/2018-urine culture showing gram-negative rods waiting for complete report 

patient is on Invanz plan is to continue the current management.





12/28/2018 urine culture positive for E. coli and Invanz.  And is to continue 

the present management.





12/29/2018-urine culture came back positive for E. coli pansensitive patient is 

on Invanz.  Patient is afebrile.  Blood pressures are stable.








(3) Diabetes mellitus type 2 in obese


Is this a current diagnosis for this admission?: Yes   


Plan: 


Patient will be treated with her usual diabetic therapy with the addition of a 

sliding scale insulin coverage for hyperglycemia.  Hemoglobin A1c will be 

obtained to assess the efficacy of the prior therapy and adjustments will be 

made as appropriate.








12/26/2018-started on insulin sliding scale.  Blood sugars are 253 today.  

Patient is on metformin at home.  We are going to hold metformin.  Patient is 

also on Victoza.  We are going to hold Victoza during this hospital stay.  

Hemoglobin A1c is pending.





12/27/2018 hemoglobin A1c is 8.7 patient's latest blood sugar is 208 she is on 

insulin sliding scale and also on Lantus 10 units twice daily I am going to 

increase the Lantus to 20 units twice a day.  Besides weight loss and lifestyle 

modifications discussed with the patient.








12/28/2018 hemoglobin A1c is 8.7 today's blood sugar is 202.  Blood sugars are 

running more than 200s.  I am going to increase the Lantus to 30 units twice a 

day.  Dietary consult was requested.  She is also interested in speaking to the 

dietitian.





12/29/2018 patient's latest blood sugar is 190.  Patient is on Lantus 30 units 

twice a day and insulin sliding scale.  Patient is on Victoza, metformin 

thousand milligrams extended release twice a day at home.  The toes on metformin

is on hold.  I increased the Lantus to 40 units twice a day today.  I change the

insulin sliding scale to before meals and at bedtime.  Patient's hemoglobin A1c 

is 8.7.  Diet exercise weight loss was advised.








(4) HTN (hypertension)


Qualifiers: 


   Hypertension type: essential hypertension   Qualified Code(s): I10 - 

Essential (primary) hypertension   


Is this a current diagnosis for this admission?: Yes   


Plan: 


Patient will be continued on her current antihypertensive regiment once her home

meds have been established.  Efficacy of this therapy will be assessed over 

hospital course with serial evaluations of her vital signs including blood 

pressure.








12/26/2018 blood pressure is 120/66.  Plan for today is to discontinue IV fluids

and restart on losartan.  pT is on 50 mg of losartan daily at home.





12/27/2018 blood pressure today is 105/67 patient is asymptomatic.  Patient is 

on losartan 50 mg p.o. daily.  





12 28 2018-blood pressure is 101/55 patient is asymptomatic.  Rate is 82.  He is

on losartan 50 mg p.o. daily.  Plan is to continue the present management.








12/29/2018-patient blood pressure is 120/64.  Patient is on losartan 25 mg p.o. 

daily.  Is to continue the present management.








(5) Morbid obesity with BMI of 45.0-49.9, adult


Is this a current diagnosis for this admission?: Yes   


Plan: 


12/26/2018-diet exercise weight loss and lifestyle modifications are advised.  

Dietary consult was requested.  BMI is more than 50.





12/27/2018-plan is to continue the present management dietary consult was 

requested.





12/28/2018-diet exercise weight loss and life style modifications were 

discussed.  Dietitian consult was requested.








12/29/2018-patient's BMI is more than 45 diet exercise weight loss was advised 

we are going to provide portable oxygen to the patient so she can walk around in

the hallway.








- Time


Time Spent with patient: 15-24 minutes


Medications reviewed and adjusted accordingly: Yes


Anticipated discharge: Home

## 2018-12-30 VITALS — DIASTOLIC BLOOD PRESSURE: 54 MMHG | SYSTOLIC BLOOD PRESSURE: 102 MMHG

## 2018-12-30 LAB
ADD MANUAL DIFF: NO
ALBUMIN SERPL-MCNC: 4 G/DL (ref 3.5–5)
ALP SERPL-CCNC: 73 U/L (ref 38–126)
ALT SERPL-CCNC: 29 U/L (ref 9–52)
ANION GAP SERPL CALC-SCNC: 10 MMOL/L (ref 5–19)
AST SERPL-CCNC: 67 U/L (ref 14–36)
BASOPHILS # BLD AUTO: 0.1 10^3/UL (ref 0–0.2)
BASOPHILS NFR BLD AUTO: 1.4 % (ref 0–2)
BILIRUB DIRECT SERPL-MCNC: 0.3 MG/DL (ref 0–0.4)
BILIRUB SERPL-MCNC: 0.3 MG/DL (ref 0.2–1.3)
BUN SERPL-MCNC: 16 MG/DL (ref 7–20)
CALCIUM: 9.4 MG/DL (ref 8.4–10.2)
CHLORIDE SERPL-SCNC: 103 MMOL/L (ref 98–107)
CO2 SERPL-SCNC: 27 MMOL/L (ref 22–30)
EOSINOPHIL # BLD AUTO: 0.3 10^3/UL (ref 0–0.6)
EOSINOPHIL NFR BLD AUTO: 4.2 % (ref 0–6)
ERYTHROCYTE [DISTWIDTH] IN BLOOD BY AUTOMATED COUNT: 13.7 % (ref 11.5–14)
GLUCOSE SERPL-MCNC: 193 MG/DL (ref 75–110)
HCT VFR BLD CALC: 34.3 % (ref 36–47)
HGB BLD-MCNC: 11.3 G/DL (ref 12–15.5)
LYMPHOCYTES # BLD AUTO: 2.6 10^3/UL (ref 0.5–4.7)
LYMPHOCYTES NFR BLD AUTO: 41.9 % (ref 13–45)
MCH RBC QN AUTO: 28.2 PG (ref 27–33.4)
MCHC RBC AUTO-ENTMCNC: 32.9 G/DL (ref 32–36)
MCV RBC AUTO: 86 FL (ref 80–97)
MONOCYTES # BLD AUTO: 0.5 10^3/UL (ref 0.1–1.4)
MONOCYTES NFR BLD AUTO: 8.8 % (ref 3–13)
NEUTROPHILS # BLD AUTO: 2.7 10^3/UL (ref 1.7–8.2)
NEUTS SEG NFR BLD AUTO: 43.7 % (ref 42–78)
PLATELET # BLD: 317 10^3/UL (ref 150–450)
POTASSIUM SERPL-SCNC: 4.1 MMOL/L (ref 3.6–5)
PROT SERPL-MCNC: 7.2 G/DL (ref 6.3–8.2)
RBC # BLD AUTO: 4 10^6/UL (ref 3.72–5.28)
SODIUM SERPL-SCNC: 140.3 MMOL/L (ref 137–145)
TOTAL CELLS COUNTED % (AUTO): 100 %
WBC # BLD AUTO: 6.2 10^3/UL (ref 4–10.5)

## 2018-12-30 RX ADMIN — DOCUSATE SODIUM SCH: 100 CAPSULE, LIQUID FILLED ORAL at 09:07

## 2018-12-30 RX ADMIN — BUPROPION HYDROCHLORIDE SCH MG: 100 TABLET, FILM COATED ORAL at 05:04

## 2018-12-30 RX ADMIN — FAMOTIDINE SCH MG: 20 TABLET, FILM COATED ORAL at 09:14

## 2018-12-30 RX ADMIN — INSULIN HUMAN PRN UNIT: 100 INJECTION, SOLUTION PARENTERAL at 08:45

## 2018-12-30 RX ADMIN — MORPHINE SULFATE PRN MG: 10 INJECTION INTRAMUSCULAR; INTRAVENOUS; SUBCUTANEOUS at 08:51

## 2018-12-30 RX ADMIN — Medication SCH ML: at 05:05

## 2018-12-30 RX ADMIN — HEPARIN SODIUM SCH UNIT: 5000 INJECTION, SOLUTION INTRAVENOUS; SUBCUTANEOUS at 05:04

## 2018-12-30 RX ADMIN — MORPHINE SULFATE PRN MG: 10 INJECTION INTRAMUSCULAR; INTRAVENOUS; SUBCUTANEOUS at 04:04

## 2018-12-30 RX ADMIN — GABAPENTIN SCH MG: 400 CAPSULE ORAL at 05:04

## 2018-12-30 RX ADMIN — INSULIN GLARGINE SCH UNIT: 100 INJECTION, SOLUTION SUBCUTANEOUS at 09:15

## 2018-12-30 RX ADMIN — LOSARTAN POTASSIUM SCH MG: 25 TABLET, FILM COATED ORAL at 09:14

## 2018-12-30 RX ADMIN — OMEGA-3-ACID ETHYL ESTERS SCH GM: 900 CAPSULE ORAL at 09:14

## 2018-12-30 RX ADMIN — FENOFIBRATE SCH MG: 145 TABLET ORAL at 08:45

## 2018-12-30 RX ADMIN — ERTAPENEM SODIUM SCH MLS/HR: 1 INJECTION, POWDER, LYOPHILIZED, FOR SOLUTION INTRAMUSCULAR; INTRAVENOUS at 05:04

## 2018-12-30 RX ADMIN — VENLAFAXINE HYDROCHLORIDE SCH MG: 75 CAPSULE, EXTENDED RELEASE ORAL at 09:14

## 2018-12-30 NOTE — PDOC DISCHARGE SUMMARY
General





- Admit/Disc Date/PCP


Admission Date/Primary Care Provider: 


  12/26/18 01:47





  





Discharge Date: 12/30/18





- Discharge Diagnosis


(1) SIRS (systemic inflammatory response syndrome)


Is this a current diagnosis for this admission?: Yes   


Summary: 


With lactic acidosis, mild leukocytosis, minimal tachycardia and a possible 

urinary tract infection, SIRS criteria have been met.  However this patient is 

very unlikely to have sepsis, so much so that at this point it can be 

affirmatively stated that sepsis has been ruled out.  She will be treated with 

broad-spectrum antibiotics for her role urinary tract infection and serial 

lactic acid measurements will be obtained her vital signs will be observed and 

her overall clinical course will be monitored.  Serial laboratory evaluations of

her metabolic profile and CBC will be obtained.








12/26/2018-temperature today is 99.6.  Patient is on Invanz.  Lactic acid level 

came down to 2.2.  We are going to continue to follow the CBC and chemistry.  

Cultures urine cultures are pending.  Plan is to continue the present 

management.





12/27/2018 T-max is 99.5.  Patient is on Invanz.  lactic acid Level is 1.3.  WBC

7.4.  Urine culture showing gram-negative rods.  Plan is to continue the present

management.





12/28/2018 T-max is 98.5.  Urine culture came back positive for E. coli.  Blood 

cultures positive for gram-negative rods.  Patient is on Invanz.  Lactic acid 

level is 1.3.  Plan is to continue the current management.





12/29/2018 T-max is 98.2.  Blood cultures showing gram-negative rods, urine 

culture is positive for E. coli.  Patient is on Invanz.  Latest lactic acid 

level is 1.3.  Hypotension resolved.  plan is to continue 1 more day of IV 

antibiotic therapy .  Probably I am going to discharge her home tomorrow on p.o.

antibiotics.





12/30/2018-patient was admitted for sepsis.  Urine culture came back positive 

for E. coli pansensitive and blood cultures are positive for gram-negative rods 

she was treated with Invanz IV she is afebrile for the last 48 hours has started

her on levofloxacin 500 mg p.o. daily for 1 week she is going to go home today 

with  p.o. antibiotics.








(2) UTI (urinary tract infection)


Is this a current diagnosis for this admission?: Yes   


Summary: 


Patient was noted to have acute right CVA tenderness far greater than the left 

giving suspicion to a distinct possibility of an acute right pyelonephritis.  

Her urine does show significant pyuria based on her positive leukocyte esterase 

however a moderately positive blood chemical reaction is not necessarily 

indicative of hematuria.  She will be treated with broad-spectrum antibiotics 

until urine culture results are available and spectrum of the antibiotic therapy

can be narrowed as appropriate.





12/26/2018-on examination there is no right CVA tenderness.  Urinalysis shows 

pyuria with positive leukocyte esterase.  Cultures are pending.  Patient is on 

broad-spectrum antibiotics.  Is to continue the present management and wait for 

the urine cultures.








12/27/2018-urine culture showing gram-negative rods waiting for complete report 

patient is on Invanz plan is to continue the current management.





12/28/2018 urine culture positive for E. coli and Invanz.  And is to continue th

e present management.





12/29/2018-urine culture came back positive for E. coli pansensitive patient is 

on Invanz.  Patient is afebrile.  Blood pressures are stable.








12/30/2018 urine culture came back positive for E. coli patient was on Invanz 

she is going home on levofloxacin 500 mg p.o. daily for 1 week.








(3) Diabetes mellitus type 2 in obese


Is this a current diagnosis for this admission?: Yes   


Summary: 


Patient will be treated with her usual diabetic therapy with the addition of a 

sliding scale insulin coverage for hyperglycemia.  Hemoglobin A1c will be 

obtained to assess the efficacy of the prior therapy and adjustments will be 

made as appropriate.








12/26/2018-started on insulin sliding scale.  Blood sugars are 253 today.  

Patient is on metformin at home.  We are going to hold metformin.  Patient is 

also on Victoza.  We are going to hold Victoza during this hospital stay.  

Hemoglobin A1c is pending.





12/27/2018 hemoglobin A1c is 8.7 patient's latest blood sugar is 208 she is on 

insulin sliding scale and also on Lantus 10 units twice daily I am going to 

increase the Lantus to 20 units twice a day.  Besides weight loss and lifestyle 

modifications discussed with the patient.








12/28/2018 hemoglobin A1c is 8.7 today's blood sugar is 202.  Blood sugars are 

running more than 200s.  I am going to increase the Lantus to 30 units twice a 

day.  Dietary consult was requested.  She is also interested in speaking to the 

dietitian.





12/29/2018 patient's latest blood sugar is 190.  Patient is on Lantus 30 units 

twice a day and insulin sliding scale.  Patient is on Victoza, metformin 

thousand milligrams extended release twice a day at home.  The toes on metformin

is on hold.  I increased the Lantus to 40 units twice a day today.  I change the

insulin sliding scale to before meals and at bedtime.  Patient's hemoglobin A1c 

is 8.7.  Diet exercise weight loss was advised.





12/30/2018-patient blood sugar today is 193.  Patient's hemoglobin A1c is 8.7.  

Patient was on Victoza and metformin at home advised her to continue the home 

medications when she is discharged.  Diet exercise weight loss was advised.








(4) HTN (hypertension)


Is this a current diagnosis for this admission?: Yes   


Summary: 


Patient will be continued on her current antihypertensive regiment once her home

meds have been established.  Efficacy of this therapy will be assessed over 

hospital course with serial evaluations of her vital signs including blood 

pressure.








12/26/2018 blood pressure is 120/66.  Plan for today is to discontinue IV fluids

and restart on losartan.  pT is on 50 mg of losartan daily at home.





12/27/2018 blood pressure today is 105/67 patient is asymptomatic.  Patient is 

on losartan 50 mg p.o. daily.  





12 28 2018-blood pressure is 101/55 patient is asymptomatic.  Rate is 82.  He is

on losartan 50 mg p.o. daily.  Plan is to continue the present management.








12/29/2018-patient blood pressure is 120/64.  Patient is on losartan 25 mg p.o. 

daily.  Is to continue the present management.





12/30/2018-patient blood pressure is 122/70.  Patient is on losartan 25 mg p.o. 

twice daily patient was advised to continue the medication when she goes home.








(5) Morbid obesity with BMI of 45.0-49.9, adult


Is this a current diagnosis for this admission?: Yes   


Summary: 


12/26/2018-diet exercise weight loss and lifestyle modifications are advised.  

Dietary consult was requested.  BMI is more than 50.





12/27/2018-plan is to continue the present management dietary consult was 

requested.





12/28/2018-diet exercise weight loss and life style modifications were 

discussed.  Dietitian consult was requested.








12/29/2018-patient's BMI is more than 45 diet exercise weight loss was advised 

we are going to provide portable oxygen to the patient so she can walk around in

the hallway.





12/30/2018 patient's BMI is more than 45 diet exercise weight loss was advised 

on dietary consult was done while she was in the hospital.








- Additional Information


Resuscitation Status: Full Code


Discharge Diet: As Tolerated, Diabetic


Discharge Activity: Activity As Tolerated


Prescriptions: 


Levofloxacin [Levaquin 500 mg Tablet] 500 mg PO DAILY #10 tablet


Oxycodone HCl/Acetaminophen [Percocet  Mg Tablet] 1 each PO Q6HP PRN #20 

tablet


 PRN Reason: 


Home Medications: 








Aspirin [Ecotrin 81 mg EC Tablet] 81 mg PO QHS 12/26/18 


Bupropion HCl [Wellbutrin Xl 300mg 24hr Tablet] 300 mg PO DAILY 12/26/18 


Cyclobenzaprine HCl [Flexeril 10 mg Tablet] 10 mg PO QHS 12/26/18 


Fenofibrate 160 mg PO QAM 12/26/18 


Gabapentin [Neurontin] 800 mg PO Q8 12/26/18 


Liraglutide [Victoza 2-Dash] 0.6 mg PO DAILY 12/26/18 


Losartan Potassium [Cozaar 50 mg Tablet] 50 mg PO QAM 12/26/18 


Lovastatin [Mevacor] 20 mg PO QHS 12/26/18 


Melatonin [Melatonin 5 mg Tablet] 10 mg PO QHS 12/26/18 


Metformin HCl [Glucophage 500 mg Tablet] 1,000 mg PO BID 12/26/18 


Methenamine Hippurate [Hiprex] 1 gm PO BID 12/26/18 


Omega-3 Fatty Acids/Fish Oil [Fish Oil 1,000 mg Capsule] 2 cap PO BID 12/26/18 


Quetiapine Fumarate [Seroquel] 400 mg PO QHS 12/26/18 


Venlafaxine HCl ER [Effexor Xr 75 mg Cap.sr] 150 mg PO DAILY 12/26/18 


Levofloxacin [Levaquin 500 mg Tablet] 500 mg PO DAILY #10 tablet 12/30/18 


Morphine Sulfate [Morphine 10 mg/ml Inj] 1 mg IV Q4HP PRN  vial 12/30/18 


Oxycodone HCl/Acetaminophen [Percocet  Mg Tablet] 1 each PO Q6HP PRN #20 

tablet 12/30/18 











History of Present Illness


History of Present Illness: 


ROJAS ORELLANA is a 53 year old female


a 53 year old female who presents the emergency room with a 1 day history of 

fever with chills associated with lower back pain.  Subjective fever with chills

has been severe and the lumbar (vague, nonradiating, bilateral right possibly 

worse than left) pressure-like pain which she rates as very severe.  Both the 

pain and fever have been present since their onset on the day prior to admi

ssion.  The symptoms began as a backache and low-grade fever in the early 

afternoon and progressively worsened gradually throughout the evening until she 

came to the emergency room.  She admits numerous prior similar episodes some of 

which have required hospitalization and treatment for "sepsis", these usually 

occur with urinary tract or respiratory tract infections.  She has not 

identified any aggravating or ameliorating factors for her fever or back pain.  

In the emergency room she was found to have a normal white count and be 

afebrile.  Her urinalysis was suspect for a urinary tract infection and she was 

about to be discharged home when her serum lactic acid was reported is 4.0.  The

patient was then admitted to the hospital to be evaluated for possible sepsis at

the insistence of the emergency room physician.








Physical Exam


Vital Signs: 


                                        











Temp Pulse Resp BP Pulse Ox


 


 98.2 F   83   16   102/54 L  94 


 


 12/30/18 11:37  12/30/18 11:37  12/30/18 11:37  12/30/18 11:37  12/30/18 11:37








                                 Intake & Output











 12/29/18 12/30/18 12/31/18





 06:59 06:59 06:59


 


Intake Total 1594 1456 


 


Balance 1594 1456 


 


Weight 125.4 kg 124.2 kg 











General appearance: PRESENT: no acute distress


Eye exam: PRESENT: PERRLA


Mouth exam: PRESENT: moist


Neck exam: ABSENT: carotid bruit, JVD, lymphadenopathy, thyromegaly


Respiratory exam: PRESENT: clear to auscultation david.  ABSENT: rales, rhonchi, 

wheezes


Cardiovascular exam: PRESENT: RRR.  ABSENT: diastolic murmur, rubs, systolic 

murmur


Pulses: PRESENT: normal dorsalis pedis pul


Neurological exam: PRESENT: alert, awake, oriented to person, oriented to place,

oriented to time, oriented to situation, CN II-XII grossly intact.  ABSENT: 

motor sensory deficit


Psychiatric exam: PRESENT: appropriate affect, normal mood.  ABSENT: homicidal 

ideation, suicidal ideation





Results


Laboratory Results: 


                                        





                                 12/30/18 04:24 





                                 12/30/18 04:24 





                                        











  12/30/18 12/30/18





  04:24 04:24


 


WBC  6.2 


 


RBC  4.00 


 


Hgb  11.3 L 


 


Hct  34.3 L 


 


MCV  86 


 


MCH  28.2 


 


MCHC  32.9 


 


RDW  13.7 


 


Plt Count  317 


 


Seg Neutrophils %  43.7 


 


Lymphocytes %  41.9 


 


Monocytes %  8.8 


 


Eosinophils %  4.2 


 


Basophils %  1.4 


 


Absolute Neutrophils  2.7 


 


Absolute Lymphocytes  2.6 


 


Absolute Monocytes  0.5 


 


Absolute Eosinophils  0.3 


 


Absolute Basophils  0.1 


 


Sodium   140.3


 


Potassium   4.1


 


Chloride   103


 


Carbon Dioxide   27


 


Anion Gap   10


 


BUN   16


 


Creatinine   0.74


 


Est GFR ( Amer)   > 60


 


Est GFR (Non-Af Amer)   > 60


 


Glucose   193 H


 


Calcium   9.4


 


Magnesium   1.9


 


Total Bilirubin   0.3


 


AST   67 H


 


ALT   29


 


Alkaline Phosphatase   73


 


Total Protein   7.2


 


Albumin   4.0











Impressions: 


                                        





Chest X-Ray  12/26/18 00:25


IMPRESSION:


 


No acute cardiopulmonary process


 


 


copyright 2011 [employer]- All Rights Reserved


 














Qualifiers





- *


PATIENT BEING DISCHARGED WITH ANY OF THE FOLLOWING DIAGNOSIS: No


VTE patient discharged on overlapping Therapy?: Yes